# Patient Record
Sex: MALE | Race: OTHER
[De-identification: names, ages, dates, MRNs, and addresses within clinical notes are randomized per-mention and may not be internally consistent; named-entity substitution may affect disease eponyms.]

---

## 2023-03-22 ENCOUNTER — FORM ENCOUNTER (OUTPATIENT)
Age: 38
End: 2023-03-22

## 2023-03-23 ENCOUNTER — INPATIENT (INPATIENT)
Facility: HOSPITAL | Age: 38
LOS: 4 days | Discharge: HOME | DRG: 917 | End: 2023-03-28
Attending: STUDENT IN AN ORGANIZED HEALTH CARE EDUCATION/TRAINING PROGRAM | Admitting: INTERNAL MEDICINE
Payer: COMMERCIAL

## 2023-03-23 VITALS
SYSTOLIC BLOOD PRESSURE: 97 MMHG | RESPIRATION RATE: 26 BRPM | DIASTOLIC BLOOD PRESSURE: 53 MMHG | OXYGEN SATURATION: 84 % | HEART RATE: 109 BPM

## 2023-03-23 LAB
ALBUMIN SERPL ELPH-MCNC: 3.6 G/DL — SIGNIFICANT CHANGE UP (ref 3.4–5)
ALBUMIN SERPL ELPH-MCNC: 3.9 G/DL — SIGNIFICANT CHANGE UP (ref 3.3–5)
ALP SERPL-CCNC: 100 U/L — SIGNIFICANT CHANGE UP (ref 40–120)
ALP SERPL-CCNC: 87 U/L — SIGNIFICANT CHANGE UP (ref 40–120)
ALT FLD-CCNC: 105 U/L — HIGH (ref 10–45)
ALT FLD-CCNC: 142 U/L — HIGH (ref 12–42)
AMPHET UR-MCNC: NEGATIVE — SIGNIFICANT CHANGE UP
ANION GAP SERPL CALC-SCNC: 10 MMOL/L — SIGNIFICANT CHANGE UP (ref 9–16)
ANION GAP SERPL CALC-SCNC: 8 MMOL/L — SIGNIFICANT CHANGE UP (ref 5–17)
APAP SERPL-MCNC: <2 UG/ML — LOW (ref 10–30)
APPEARANCE UR: CLEAR — SIGNIFICANT CHANGE UP
APTT BLD: 32.8 SEC — SIGNIFICANT CHANGE UP (ref 27.5–35.5)
AST SERPL-CCNC: 137 U/L — HIGH (ref 10–40)
AST SERPL-CCNC: 205 U/L — HIGH (ref 15–37)
BACTERIA # UR AUTO: PRESENT /HPF
BARBITURATES UR SCN-MCNC: NEGATIVE — SIGNIFICANT CHANGE UP
BASE EXCESS BLDA CALC-SCNC: -1.2 MMOL/L — SIGNIFICANT CHANGE UP (ref -2–3)
BASE EXCESS BLDA CALC-SCNC: 4 MMOL/L — HIGH (ref -2–3)
BASOPHILS # BLD AUTO: 0.02 K/UL — SIGNIFICANT CHANGE UP (ref 0–0.2)
BASOPHILS NFR BLD AUTO: 0.1 % — SIGNIFICANT CHANGE UP (ref 0–2)
BENZODIAZ UR-MCNC: NEGATIVE — SIGNIFICANT CHANGE UP
BILIRUB SERPL-MCNC: 0.5 MG/DL — SIGNIFICANT CHANGE UP (ref 0.2–1.2)
BILIRUB SERPL-MCNC: 0.5 MG/DL — SIGNIFICANT CHANGE UP (ref 0.2–1.2)
BILIRUB UR-MCNC: NEGATIVE — SIGNIFICANT CHANGE UP
BLD GP AB SCN SERPL QL: NEGATIVE — SIGNIFICANT CHANGE UP
BUN SERPL-MCNC: 11 MG/DL — SIGNIFICANT CHANGE UP (ref 7–23)
BUN SERPL-MCNC: 9 MG/DL — SIGNIFICANT CHANGE UP (ref 7–23)
CALCIUM SERPL-MCNC: 8.5 MG/DL — SIGNIFICANT CHANGE UP (ref 8.5–10.5)
CALCIUM SERPL-MCNC: 8.9 MG/DL — SIGNIFICANT CHANGE UP (ref 8.4–10.5)
CHLORIDE SERPL-SCNC: 103 MMOL/L — SIGNIFICANT CHANGE UP (ref 96–108)
CHLORIDE SERPL-SCNC: 104 MMOL/L — SIGNIFICANT CHANGE UP (ref 96–108)
CK MB CFR SERPL CALC: 4.7 NG/ML — SIGNIFICANT CHANGE UP (ref 0–6.7)
CK SERPL-CCNC: 468 U/L — HIGH (ref 30–200)
CO2 BLDA-SCNC: 29 MMOL/L — HIGH (ref 19–24)
CO2 BLDA-SCNC: 30 MMOL/L — HIGH (ref 19–24)
CO2 SERPL-SCNC: 27 MMOL/L — SIGNIFICANT CHANGE UP (ref 22–31)
CO2 SERPL-SCNC: 28 MMOL/L — SIGNIFICANT CHANGE UP (ref 22–31)
COCAINE METAB.OTHER UR-MCNC: POSITIVE
COLOR SPEC: YELLOW — SIGNIFICANT CHANGE UP
COMMENT - URINE: SIGNIFICANT CHANGE UP
CREAT SERPL-MCNC: 0.85 MG/DL — SIGNIFICANT CHANGE UP (ref 0.5–1.3)
CREAT SERPL-MCNC: 1.29 MG/DL — SIGNIFICANT CHANGE UP (ref 0.5–1.3)
DIFF PNL FLD: ABNORMAL
EGFR: 114 ML/MIN/1.73M2 — SIGNIFICANT CHANGE UP
EGFR: 73 ML/MIN/1.73M2 — SIGNIFICANT CHANGE UP
EOSINOPHIL # BLD AUTO: 0 K/UL — SIGNIFICANT CHANGE UP (ref 0–0.5)
EOSINOPHIL NFR BLD AUTO: 0 % — SIGNIFICANT CHANGE UP (ref 0–6)
EPI CELLS # UR: SIGNIFICANT CHANGE UP /HPF (ref 0–5)
ETHANOL SERPL-MCNC: <3 MG/DL — SIGNIFICANT CHANGE UP
FLUAV AG NPH QL: SIGNIFICANT CHANGE UP
FLUBV AG NPH QL: SIGNIFICANT CHANGE UP
GLUCOSE BLDC GLUCOMTR-MCNC: 81 MG/DL — SIGNIFICANT CHANGE UP (ref 70–99)
GLUCOSE SERPL-MCNC: 262 MG/DL — HIGH (ref 70–99)
GLUCOSE SERPL-MCNC: 84 MG/DL — SIGNIFICANT CHANGE UP (ref 70–99)
GLUCOSE UR QL: 250
GRAM STN FLD: SIGNIFICANT CHANGE UP
GRAN CASTS # UR COMP ASSIST: ABNORMAL /LPF
HCO3 BLDA-SCNC: 28 MMOL/L — SIGNIFICANT CHANGE UP (ref 21–28)
HCO3 BLDA-SCNC: 28 MMOL/L — SIGNIFICANT CHANGE UP (ref 21–28)
HCT VFR BLD CALC: 38.7 % — LOW (ref 39–50)
HCT VFR BLD CALC: 42 % — SIGNIFICANT CHANGE UP (ref 39–50)
HGB BLD-MCNC: 12.1 G/DL — LOW (ref 13–17)
HGB BLD-MCNC: 13.3 G/DL — SIGNIFICANT CHANGE UP (ref 13–17)
HOROWITZ INDEX BLDA+IHG-RTO: 40 — SIGNIFICANT CHANGE UP
HYALINE CASTS # UR AUTO: ABNORMAL /LPF (ref 0–2)
IMM GRANULOCYTES NFR BLD AUTO: 0.3 % — SIGNIFICANT CHANGE UP (ref 0–0.9)
INR BLD: 1.05 — SIGNIFICANT CHANGE UP (ref 0.88–1.16)
KETONES UR-MCNC: NEGATIVE — SIGNIFICANT CHANGE UP
LACTATE SERPL-SCNC: 0.6 MMOL/L — SIGNIFICANT CHANGE UP (ref 0.5–2)
LACTATE SERPL-SCNC: 2.7 MMOL/L — HIGH (ref 0.4–2)
LEUKOCYTE ESTERASE UR-ACNC: NEGATIVE — SIGNIFICANT CHANGE UP
LIDOCAIN IGE QN: 207 U/L — SIGNIFICANT CHANGE UP (ref 73–393)
LYMPHOCYTES # BLD AUTO: 1.29 K/UL — SIGNIFICANT CHANGE UP (ref 1–3.3)
LYMPHOCYTES # BLD AUTO: 9.3 % — LOW (ref 13–44)
MAGNESIUM SERPL-MCNC: 1.8 MG/DL — SIGNIFICANT CHANGE UP (ref 1.6–2.6)
MCHC RBC-ENTMCNC: 31.3 GM/DL — LOW (ref 32–36)
MCHC RBC-ENTMCNC: 31.4 PG — SIGNIFICANT CHANGE UP (ref 27–34)
MCHC RBC-ENTMCNC: 31.7 GM/DL — LOW (ref 32–36)
MCHC RBC-ENTMCNC: 31.8 PG — SIGNIFICANT CHANGE UP (ref 27–34)
MCV RBC AUTO: 100.5 FL — HIGH (ref 80–100)
MCV RBC AUTO: 100.5 FL — HIGH (ref 80–100)
METHADONE UR-MCNC: NEGATIVE — SIGNIFICANT CHANGE UP
MONOCYTES # BLD AUTO: 0.27 K/UL — SIGNIFICANT CHANGE UP (ref 0–0.9)
MONOCYTES NFR BLD AUTO: 1.9 % — LOW (ref 2–14)
NEUTROPHILS # BLD AUTO: 12.28 K/UL — HIGH (ref 1.8–7.4)
NEUTROPHILS NFR BLD AUTO: 88.4 % — HIGH (ref 43–77)
NITRITE UR-MCNC: NEGATIVE — SIGNIFICANT CHANGE UP
NRBC # BLD: 0 /100 WBCS — SIGNIFICANT CHANGE UP (ref 0–0)
NRBC # BLD: 0 /100 WBCS — SIGNIFICANT CHANGE UP (ref 0–0)
OPIATES UR-MCNC: NEGATIVE — SIGNIFICANT CHANGE UP
PCO2 BLDA: 37 MMHG — SIGNIFICANT CHANGE UP (ref 35–48)
PCO2 BLDA: 69 MMHG — HIGH (ref 35–48)
PCO2 BLDV: 74 MMHG — HIGH (ref 42–55)
PCO2 BLDV: 74 MMHG — HIGH (ref 42–55)
PCP SPEC-MCNC: SIGNIFICANT CHANGE UP
PCP UR-MCNC: NEGATIVE — SIGNIFICANT CHANGE UP
PH BLDA: 7.22 — LOW (ref 7.35–7.45)
PH BLDA: 7.48 — HIGH (ref 7.35–7.45)
PH BLDV: 7.18 — LOW (ref 7.32–7.43)
PH BLDV: 7.24 — LOW (ref 7.32–7.43)
PH UR: 6 — SIGNIFICANT CHANGE UP (ref 5–8)
PHOSPHATE SERPL-MCNC: 4.6 MG/DL — HIGH (ref 2.5–4.5)
PLATELET # BLD AUTO: 304 K/UL — SIGNIFICANT CHANGE UP (ref 150–400)
PLATELET # BLD AUTO: 350 K/UL — SIGNIFICANT CHANGE UP (ref 150–400)
PO2 BLDA: 110 MMHG — HIGH (ref 83–108)
PO2 BLDA: 98 MMHG — SIGNIFICANT CHANGE UP (ref 83–108)
PO2 BLDV: 212 MMHG — HIGH (ref 25–45)
PO2 BLDV: 75 MMHG — HIGH (ref 25–45)
POTASSIUM SERPL-MCNC: 3.4 MMOL/L — LOW (ref 3.5–5.3)
POTASSIUM SERPL-MCNC: 4.4 MMOL/L — SIGNIFICANT CHANGE UP (ref 3.5–5.3)
POTASSIUM SERPL-SCNC: 3.4 MMOL/L — LOW (ref 3.5–5.3)
POTASSIUM SERPL-SCNC: 4.4 MMOL/L — SIGNIFICANT CHANGE UP (ref 3.5–5.3)
PROT SERPL-MCNC: 6.7 G/DL — SIGNIFICANT CHANGE UP (ref 6–8.3)
PROT SERPL-MCNC: 7.4 G/DL — SIGNIFICANT CHANGE UP (ref 6.4–8.2)
PROT UR-MCNC: 100 MG/DL
PROTHROM AB SERPL-ACNC: 12.5 SEC — SIGNIFICANT CHANGE UP (ref 10.5–13.4)
RBC # BLD: 3.85 M/UL — LOW (ref 4.2–5.8)
RBC # BLD: 4.18 M/UL — LOW (ref 4.2–5.8)
RBC # FLD: 13.6 % — SIGNIFICANT CHANGE UP (ref 10.3–14.5)
RBC # FLD: 14 % — SIGNIFICANT CHANGE UP (ref 10.3–14.5)
RBC CASTS # UR COMP ASSIST: > 10 /HPF
RH IG SCN BLD-IMP: POSITIVE — SIGNIFICANT CHANGE UP
RSV RNA NPH QL NAA+NON-PROBE: SIGNIFICANT CHANGE UP
SALICYLATES SERPL-MCNC: 1.7 MG/DL — LOW (ref 2.8–20)
SAO2 % BLDA: 98.5 % — HIGH (ref 94–98)
SAO2 % BLDA: 98.8 % — HIGH (ref 94–98)
SAO2 % BLDV: 100 % — HIGH (ref 67–88)
SAO2 % BLDV: 94.9 % — HIGH (ref 67–88)
SARS-COV-2 RNA SPEC QL NAA+PROBE: SIGNIFICANT CHANGE UP
SODIUM SERPL-SCNC: 139 MMOL/L — SIGNIFICANT CHANGE UP (ref 135–145)
SODIUM SERPL-SCNC: 141 MMOL/L — SIGNIFICANT CHANGE UP (ref 132–145)
SP GR SPEC: >=1.03 — SIGNIFICANT CHANGE UP (ref 1–1.03)
SPECIMEN SOURCE: SIGNIFICANT CHANGE UP
THC UR QL: POSITIVE
TROPONIN T SERPL-MCNC: 0.02 NG/ML — HIGH (ref 0–0.01)
UROBILINOGEN FLD QL: 1 E.U./DL — SIGNIFICANT CHANGE UP
WBC # BLD: 13.9 K/UL — HIGH (ref 3.8–10.5)
WBC # BLD: 5.49 K/UL — SIGNIFICANT CHANGE UP (ref 3.8–10.5)
WBC # FLD AUTO: 13.9 K/UL — HIGH (ref 3.8–10.5)
WBC # FLD AUTO: 5.49 K/UL — SIGNIFICANT CHANGE UP (ref 3.8–10.5)
WBC UR QL: < 5 /HPF — SIGNIFICANT CHANGE UP

## 2023-03-23 PROCEDURE — 71045 X-RAY EXAM CHEST 1 VIEW: CPT | Mod: 26

## 2023-03-23 PROCEDURE — 70450 CT HEAD/BRAIN W/O DYE: CPT | Mod: 26

## 2023-03-23 PROCEDURE — 43753 TX GASTRO INTUB W/ASP: CPT

## 2023-03-23 PROCEDURE — 76604 US EXAM CHEST: CPT | Mod: 26,GC

## 2023-03-23 PROCEDURE — 99291 CRITICAL CARE FIRST HOUR: CPT | Mod: 25

## 2023-03-23 PROCEDURE — 93971 EXTREMITY STUDY: CPT | Mod: 26,GC

## 2023-03-23 PROCEDURE — 31500 INSERT EMERGENCY AIRWAY: CPT

## 2023-03-23 PROCEDURE — 93308 TTE F-UP OR LMTD: CPT | Mod: 26,GC

## 2023-03-23 PROCEDURE — 99291 CRITICAL CARE FIRST HOUR: CPT | Mod: GC,25

## 2023-03-23 PROCEDURE — 95718 EEG PHYS/QHP 2-12 HR W/VEEG: CPT

## 2023-03-23 RX ORDER — NOREPINEPHRINE BITARTRATE/D5W 8 MG/250ML
0.05 PLASTIC BAG, INJECTION (ML) INTRAVENOUS
Qty: 8 | Refills: 0 | Status: DISCONTINUED | OUTPATIENT
Start: 2023-03-23 | End: 2023-03-24

## 2023-03-23 RX ORDER — PROPOFOL 10 MG/ML
15 INJECTION, EMULSION INTRAVENOUS
Qty: 1000 | Refills: 0 | Status: DISCONTINUED | OUTPATIENT
Start: 2023-03-23 | End: 2023-03-24

## 2023-03-23 RX ORDER — CHLORHEXIDINE GLUCONATE 213 G/1000ML
15 SOLUTION TOPICAL EVERY 12 HOURS
Refills: 0 | Status: DISCONTINUED | OUTPATIENT
Start: 2023-03-23 | End: 2023-03-24

## 2023-03-23 RX ORDER — CEFTRIAXONE 500 MG/1
2000 INJECTION, POWDER, FOR SOLUTION INTRAMUSCULAR; INTRAVENOUS ONCE
Refills: 0 | Status: DISCONTINUED | OUTPATIENT
Start: 2023-03-23 | End: 2023-03-23

## 2023-03-23 RX ORDER — ENOXAPARIN SODIUM 100 MG/ML
40 INJECTION SUBCUTANEOUS ONCE
Refills: 0 | Status: DISCONTINUED | OUTPATIENT
Start: 2023-03-23 | End: 2023-03-23

## 2023-03-23 RX ORDER — CEFTRIAXONE 500 MG/1
2000 INJECTION, POWDER, FOR SOLUTION INTRAMUSCULAR; INTRAVENOUS EVERY 24 HOURS
Refills: 0 | Status: COMPLETED | OUTPATIENT
Start: 2023-03-24 | End: 2023-03-28

## 2023-03-23 RX ORDER — CHLORHEXIDINE GLUCONATE 213 G/1000ML
1 SOLUTION TOPICAL
Refills: 0 | Status: DISCONTINUED | OUTPATIENT
Start: 2023-03-23 | End: 2023-03-28

## 2023-03-23 RX ORDER — CEFTRIAXONE 500 MG/1
1000 INJECTION, POWDER, FOR SOLUTION INTRAMUSCULAR; INTRAVENOUS ONCE
Refills: 0 | Status: COMPLETED | OUTPATIENT
Start: 2023-03-23 | End: 2023-03-23

## 2023-03-23 RX ORDER — SODIUM CHLORIDE 9 MG/ML
1000 INJECTION INTRAMUSCULAR; INTRAVENOUS; SUBCUTANEOUS ONCE
Refills: 0 | Status: COMPLETED | OUTPATIENT
Start: 2023-03-23 | End: 2023-03-23

## 2023-03-23 RX ORDER — CEFTRIAXONE 500 MG/1
INJECTION, POWDER, FOR SOLUTION INTRAMUSCULAR; INTRAVENOUS
Refills: 0 | Status: DISCONTINUED | OUTPATIENT
Start: 2023-03-23 | End: 2023-03-23

## 2023-03-23 RX ORDER — SODIUM CHLORIDE 9 MG/ML
1000 INJECTION, SOLUTION INTRAVENOUS ONCE
Refills: 0 | Status: COMPLETED | OUTPATIENT
Start: 2023-03-23 | End: 2023-03-23

## 2023-03-23 RX ORDER — CHLORHEXIDINE GLUCONATE 213 G/1000ML
15 SOLUTION TOPICAL EVERY 12 HOURS
Refills: 0 | Status: DISCONTINUED | OUTPATIENT
Start: 2023-03-23 | End: 2023-03-23

## 2023-03-23 RX ORDER — ETOMIDATE 2 MG/ML
10 INJECTION INTRAVENOUS ONCE
Refills: 0 | Status: COMPLETED | OUTPATIENT
Start: 2023-03-23 | End: 2023-03-23

## 2023-03-23 RX ORDER — ROCURONIUM BROMIDE 10 MG/ML
100 VIAL (ML) INTRAVENOUS ONCE
Refills: 0 | Status: COMPLETED | OUTPATIENT
Start: 2023-03-23 | End: 2023-03-23

## 2023-03-23 RX ORDER — ACETAMINOPHEN 500 MG
1000 TABLET ORAL ONCE
Refills: 0 | Status: COMPLETED | OUTPATIENT
Start: 2023-03-23 | End: 2023-03-23

## 2023-03-23 RX ORDER — PROPOFOL 10 MG/ML
47.62 INJECTION, EMULSION INTRAVENOUS
Qty: 1000 | Refills: 0 | Status: DISCONTINUED | OUTPATIENT
Start: 2023-03-23 | End: 2023-03-23

## 2023-03-23 RX ORDER — NALOXONE HYDROCHLORIDE 4 MG/.1ML
0.25 SPRAY NASAL
Qty: 2 | Refills: 0 | Status: DISCONTINUED | OUTPATIENT
Start: 2023-03-23 | End: 2023-03-23

## 2023-03-23 RX ORDER — NALOXONE HYDROCHLORIDE 4 MG/.1ML
0.05 SPRAY NASAL
Qty: 2 | Refills: 0 | Status: DISCONTINUED | OUTPATIENT
Start: 2023-03-23 | End: 2023-03-23

## 2023-03-23 RX ORDER — ENOXAPARIN SODIUM 100 MG/ML
40 INJECTION SUBCUTANEOUS EVERY 24 HOURS
Refills: 0 | Status: DISCONTINUED | OUTPATIENT
Start: 2023-03-23 | End: 2023-03-28

## 2023-03-23 RX ORDER — PROPOFOL 10 MG/ML
10 INJECTION, EMULSION INTRAVENOUS
Qty: 1000 | Refills: 0 | Status: DISCONTINUED | OUTPATIENT
Start: 2023-03-23 | End: 2023-03-23

## 2023-03-23 RX ORDER — DEXMEDETOMIDINE HYDROCHLORIDE IN 0.9% SODIUM CHLORIDE 4 UG/ML
0.05 INJECTION INTRAVENOUS
Qty: 400 | Refills: 0 | Status: DISCONTINUED | OUTPATIENT
Start: 2023-03-23 | End: 2023-03-27

## 2023-03-23 RX ORDER — NALOXONE HYDROCHLORIDE 4 MG/.1ML
2 SPRAY NASAL ONCE
Refills: 0 | Status: COMPLETED | OUTPATIENT
Start: 2023-03-23 | End: 2023-03-23

## 2023-03-23 RX ADMIN — Medication 100 MILLIGRAM(S): at 04:01

## 2023-03-23 RX ADMIN — SODIUM CHLORIDE 1000 MILLILITER(S): 9 INJECTION, SOLUTION INTRAVENOUS at 07:46

## 2023-03-23 RX ADMIN — PROPOFOL 4.2 MICROGRAM(S)/KG/MIN: 10 INJECTION, EMULSION INTRAVENOUS at 04:01

## 2023-03-23 RX ADMIN — CEFTRIAXONE 100 MILLIGRAM(S): 500 INJECTION, POWDER, FOR SOLUTION INTRAMUSCULAR; INTRAVENOUS at 04:59

## 2023-03-23 RX ADMIN — Medication 1000 MILLIGRAM(S): at 08:09

## 2023-03-23 RX ADMIN — Medication 6.56 MICROGRAM(S)/KG/MIN: at 10:11

## 2023-03-23 RX ADMIN — CHLORHEXIDINE GLUCONATE 15 MILLILITER(S): 213 SOLUTION TOPICAL at 18:04

## 2023-03-23 RX ADMIN — ENOXAPARIN SODIUM 40 MILLIGRAM(S): 100 INJECTION SUBCUTANEOUS at 09:08

## 2023-03-23 RX ADMIN — CEFTRIAXONE 100 MILLIGRAM(S): 500 INJECTION, POWDER, FOR SOLUTION INTRAMUSCULAR; INTRAVENOUS at 09:08

## 2023-03-23 RX ADMIN — PROPOFOL 6.3 MICROGRAM(S)/KG/MIN: 10 INJECTION, EMULSION INTRAVENOUS at 12:13

## 2023-03-23 RX ADMIN — NALOXONE HYDROCHLORIDE 62.5 MG/HR: 4 SPRAY NASAL at 19:15

## 2023-03-23 RX ADMIN — DEXMEDETOMIDINE HYDROCHLORIDE IN 0.9% SODIUM CHLORIDE 0.88 MICROGRAM(S)/KG/HR: 4 INJECTION INTRAVENOUS at 12:12

## 2023-03-23 RX ADMIN — SODIUM CHLORIDE 1000 MILLILITER(S): 9 INJECTION INTRAMUSCULAR; INTRAVENOUS; SUBCUTANEOUS at 04:00

## 2023-03-23 RX ADMIN — NALOXONE HYDROCHLORIDE 62.5 MG/HR: 4 SPRAY NASAL at 10:07

## 2023-03-23 RX ADMIN — Medication 400 MILLIGRAM(S): at 07:18

## 2023-03-23 RX ADMIN — ETOMIDATE 10 MILLIGRAM(S): 2 INJECTION INTRAVENOUS at 04:00

## 2023-03-23 RX ADMIN — PROPOFOL 6.3 MICROGRAM(S)/KG/MIN: 10 INJECTION, EMULSION INTRAVENOUS at 16:23

## 2023-03-23 RX ADMIN — NALOXONE HYDROCHLORIDE 2 MILLIGRAM(S): 4 SPRAY NASAL at 00:30

## 2023-03-23 NOTE — H&P ADULT - HISTORY OF PRESENT ILLNESS
Patient is Male w/ no PMHx, unknown age, approximately 40 years old per [facility] ED, who presents by EMS for altered mental status. He was found on a bus with altered mental status, no bystanders on bus to provide history. He received Narcan x1 by JULI with no clinical change. EMS gave a second Narcan. Patient arrived to [facility] with white powder on his clothes and belongings.    At [facility] he was given Narcan x1 (total of 3x Narcan including ems), temp low at 94.6 rectally, ED started rewarming him. Sp02 84% on room air, continued altered mental status. He was started on non-rebreather w/ altered mental status. VBG w/ pH 7.18, pCO2 74, p02 75, 02 94.9, following VBG after non-rebreather w/ pH 7.24, pCO2 74, p02 212. Due to no improvement and continued altered mental status, patient was intubated at [facility].      In the ED  Vital signs: T 94.6 (rectally) , RR 26, Sp02 84%, BP 97/53  Labs notable for: Hemoglobin 12.1, .5, Potassium 3.4, Glucose 262, , , Lactate 2.7, UA+ blood, RBC, bacteria, granular casts, UTox + THC and Cocaine  Interventions: CTX 1g, Etomidate 10mg IV Push, Naloxone 2mg IV Push, Rocuronium 100mg IV Push, NS 1L. Propofol started for sedation after intubation.  Consults: ICU

## 2023-03-23 NOTE — ED ADULT NURSE REASSESSMENT NOTE - NS ED NURSE REASSESS COMMENT FT1
While in CT BP decreased. Propofol decreased to 5mcg/ 2.1mL/hr by RN Hyde. Report given and waiting of transportation for transfer to [facility].

## 2023-03-23 NOTE — ED ADULT NURSE REASSESSMENT NOTE - NS ED NURSE REASSESS COMMENT FT1
Pt. remains at baseline from when he arrived - unresponsive. As per Radha, pt. will have to be intubated. Pt. remains at baseline from when he arrived - unresponsive. Demetra Randi remains in place with improvement in temp noted.  As per Radha, pt. will have to be intubated.

## 2023-03-23 NOTE — H&P ADULT - ASSESSMENT
Neuro    #Altered mental status  Differential includes seizures vs trauma vs drug-induced encephalopathy. Patient found on bus with altered mental status. UTox + for THC and cocaine. Also, found w/ white powder on him, per ED signout. Lactate elevated to 2.7.  - get CPK for seizure workup  - f/u CT head  - vEEG  - f/u ammonia level  - f/u thyroid level  - consider LP  - trend lactate    #Hypothermia  Patient w/ temp of 94.6 degrees F on admisison to [facility]. After warming, repeat temp at 97.9 degrees F. Differential includes hypothyroidism.   - f/u tsh    #Sedation  - c/w Propofol w/ RAAS -3 to -4  - no fentanyl, avoid opiaties iso concern for drug overdose (s/p 3x narcan)    Pulmonary    #Intubated  Intubated for airway protection and respiratory failure at [facility].  - SBT in am    #Acute hypercarbic respiratory failure  ABG w/ pH 7.18, pCO2 74 on admission. Requiring intubation for altered mental status and respiratory failure at [facility]. Differential includes drug intoxication causing respiratory depression causing respiratory acidosis.   - increase minute ventilation on vent to blow off co2- , RR 16, PEEP 5, Fi02 40%  - confirm ET tube w/ chest xray  - f/u abg on arrival    Cardiovascular    #Hypotension  Patient w/ soft BPs at [facility]- 92/53 on admission. Not currently requiring pressors.  - keep BP normotensive  - can give IV fluids- might have cold diuresis  - f/u cardiac enzymes  - avoid beta blockers  - if have to give pressor- would consider neosynephrine    Renal    #Elevated creatinine, unknown baseline  - IV fluid resuscitation as needed  - f/u creatine kinase   - f/u mag and phos  - trend creatinine  - avoid nephrotoxic meds      Heme    #Anemia  Hemoglobin 12.1. No signs of bleeding on exam.  - continue to trend hgb  - active type and screen  - transfuse for hgb <7    ID  No obvious source of infection. No leukocytosis, afebrile.  - trend wbc  - f/u blood cultures    Preventive measures  F: None  E: replete as necessary  D: NPO  DVT: SCDs   Neuro    #Altered mental status  Differential includes seizures vs trauma vs drug-induced encephalopathy. Patient found on bus with altered mental status. Received Narcan x3. UTox + for THC and cocaine. Per ED signout- also, found w/ white powder on him. Lactate elevated to 2.7.   - On physical exam- goosebumps, stool on legs, miosis, consider opioid withdrawal.  - get CPK for seizure workup  - f/u CT head  - vEEG  - f/u ammonia level  - f/u thyroid level  - consider LP  - trend lactate    #Hypothermia  Patient w/ temp of 94.6 degrees F on admisison to [facility]. After warming, repeat temp at 97.9 degrees F. Differential includes hypothyroidism.   - f/u tsh    #Sedation  - c/w Propofol w/ RAAS -3 to -4  - no fentanyl, avoid opiaties iso concern for drug overdose (s/p 3x narcan)    Pulmonary    #Intubated  Intubated for airway protection and respiratory failure at [facility].  - SBT in am    #Acute hypercarbic respiratory failure  ABG w/ pH 7.18, pCO2 74 on admission. Requiring intubation for altered mental status and respiratory failure at [facility]. Differential includes drug intoxication causing respiratory depression causing respiratory acidosis.   - increase minute ventilation on vent to blow off co2- , RR 16, PEEP 5, Fi02 40%  - confirm ET tube w/ chest xray  - f/u abg on arrival    Cardiovascular    #Hypotension  Patient w/ soft BPs at [facility]- 92/53 on admission. Not currently requiring pressors.  - keep BP normotensive  - can give IV fluids- might have cold diuresis  - f/u cardiac enzymes  - avoid beta blockers  - if have to give pressor- would consider neosynephrine    Renal    #Elevated creatinine, unknown baseline  - IV fluid resuscitation as needed  - f/u creatine kinase   - f/u mag and phos  - trend creatinine  - avoid nephrotoxic meds      Heme    #Anemia  Hemoglobin 12.1. No signs of bleeding on exam.  - continue to trend hgb  - active type and screen  - transfuse for hgb <7    ID  No obvious source of infection. No leukocytosis, afebrile.  - trend wbc  - f/u blood cultures    Preventive measures  F: None  E: replete as necessary  D: NPO  DVT: SCDs   Neuro    #Altered mental status  Differential includes seizures vs trauma vs drug-induced encephalopathy. Patient found on bus with altered mental status. Received Narcan x3. UTox + for THC and cocaine. Per ED signout- also, found w/ white powder on him. Lactate elevated to 2.7.   - get CPK for seizure workup  - f/u CT head  - vEEG  - f/u ammonia level  - f/u thyroid level  - consider LP  - trend lactate    #Hypothermia  Patient w/ temp of 94.6 degrees F on admisison to [facility]. After warming, repeat temp at 97.9 degrees F. Differential includes hypothyroidism.   - f/u tsh    #Sedation  - c/w Propofol w/ RAAS -3 to -4  - no fentanyl, avoid opiaties iso concern for drug overdose (s/p 3x narcan)    Pulmonary    #Intubated  Intubated for airway protection and respiratory failure at [facility].  - SBT in am    #Acute hypercarbic respiratory failure  ABG w/ pH 7.18, pCO2 74 on admission. Requiring intubation for altered mental status and respiratory failure at [facility]. Differential includes drug intoxication causing respiratory depression causing respiratory acidosis.   - increase minute ventilation on vent to blow off co2- , RR 16, PEEP 5, Fi02 40%  - confirm ET tube w/ chest xray  - f/u abg on arrival    Cardiovascular    #Hypotension  Patient w/ soft BPs at [facility]- 92/53 on admission. Not currently requiring pressors.  - keep BP normotensive  - can give IV fluids- might have cold diuresis  - f/u cardiac enzymes  - avoid beta blockers  - if have to give pressor- would consider neosynephrine    Renal    #Elevated creatinine, unknown baseline  - IV fluid resuscitation as needed  - f/u creatine kinase   - f/u mag and phos  - trend creatinine  - avoid nephrotoxic meds      Heme    #Anemia  Hemoglobin 12.1. No signs of bleeding on exam.  - continue to trend hgb  - active type and screen  - transfuse for hgb <7    ID    #R/o aspiration pneumonia  Patient w/ altered mental status, intubated for airway protection. New leukocytosis w/ WBC elevated to 13.9, febrile to 101 degrees F.   - trend wbc  - start CTX 2g qd  - f/u blood cultures  - f/u urine culture    Preventive measures  F: None  E: replete as necessary  D: NPO  DVT: SCDs   Patient is Male w/ no PMHx, unknown age, approximately 40 years old per [facility] ED, who presents for altered mental status, intubated at [facility] for airway protection, admitted for altered mental status due to drugs (?fentanyl) vs seizure vs trauma vs infectious etiology.    Neuro    #Altered mental status  Differential includes seizures vs trauma vs drug-induced encephalopathy. Patient found on bus with altered mental status. Received Narcan x3. UTox + for THC and cocaine. Per ED signout- also, found w/ white powder on him. Lactate elevated to 2.7.   - get CPK for seizure workup  - f/u CT head  - vEEG  - f/u ammonia level  - f/u thyroid level  - consider LP  - trend lactate    #Hypothermia  Patient w/ temp of 94.6 degrees F on admisison to [facility]. After warming, repeat temp at 97.9 degrees F. Differential includes hypothyroidism.   - f/u tsh    #Sedation  - c/w Propofol w/ RAAS -3 to -4  - no fentanyl, avoid opiaties iso concern for drug overdose (s/p 3x narcan)    Pulmonary    #Intubated  Intubated for airway protection and respiratory failure at [facility].  - SBT in am    #Acute hypercarbic respiratory failure  ABG w/ pH 7.18, pCO2 74 on admission. Requiring intubation for altered mental status and respiratory failure at [facility]. Differential includes drug intoxication causing respiratory depression causing respiratory acidosis.   - increase minute ventilation on vent to blow off co2- , RR 16, PEEP 5, Fi02 40%  - confirm ET tube w/ chest xray  - f/u abg on arrival    Cardiovascular    #Hypotension  Patient w/ soft BPs at [facility]- 92/53 on admission. Not currently requiring pressors.  - keep BP normotensive  - can give IV fluids- might have cold diuresis  - f/u cardiac enzymes  - avoid beta blockers  - if have to give pressor- would consider neosynephrine    Renal    #Elevated creatinine, unknown baseline  - IV fluid resuscitation as needed  - f/u creatine kinase   - f/u mag and phos  - trend creatinine  - avoid nephrotoxic meds      Heme    #Anemia  Hemoglobin 12.1. No signs of bleeding on exam.  - continue to trend hgb  - active type and screen  - transfuse for hgb <7    ID    #R/o aspiration pneumonia  Patient w/ altered mental status, intubated for airway protection. New leukocytosis w/ WBC elevated to 13.9, febrile to 101 degrees F.   - trend wbc  - start CTX 2g qd  - f/u blood cultures  - f/u urine culture    Preventive measures  F: None  E: replete as necessary  D: NPO  DVT: SCDs

## 2023-03-23 NOTE — ED PROVIDER NOTE - OBJECTIVE STATEMENT
Approximately 40-year-old male unknown past medical history presents by EMS for evaluation of altered mental status.  Per EMS they were called for AMS for a person on the bus.  On their arrival JULI had already given Narcan, but unclear if any clinical change.  EMS gave additional Narcan.  Patient unable to provide history.  No bystanders on the bus new the patient to provide any further history.  Patient arrives with a bag of white powder on his belongings.

## 2023-03-23 NOTE — ED ADULT TRIAGE NOTE - CHIEF COMPLAINT QUOTE
Pt BIBA from bus, EMS states pt was found unresponsive by , given 4mg of internasal Narcan and 2mg of IM Narcan. Pt presents unresponsive w/ labored breathing. Pt clinically upgraded to Dr Garcia, direct to room.

## 2023-03-23 NOTE — ED PROVIDER NOTE - CLINICAL SUMMARY MEDICAL DECISION MAKING FREE TEXT BOX
Approximately 40-year-old male with altered mental status.  Differential is broad including in tox as patient was noted to have drugs on him versus seizure with postictal period versus environmental exposure as patient is hypothermic versus other.  Plan for cardiac monitor, labs, additional Narcan, CT head, chest x-ray, EKG, observe and reassess

## 2023-03-23 NOTE — ED PROCEDURE NOTE - CPROC ED INFORMED CONSENT1
Benefits, risks, and possible complications of procedure explained to patient/caregiver who verbalized understanding and gave verbal consent.
6
Benefits, risks, and possible complications of procedure explained to patient/caregiver who verbalized understanding and gave verbal consent.
63yo male with urinary retention, carr catheter and urology follow up

## 2023-03-23 NOTE — PROCEDURE NOTE - NSUS ED ADDITIONAL DETAIL1 FT
DVT study performed bilaterally with no clot DVT study of Lower Extremities performed bilaterally with no clot

## 2023-03-23 NOTE — ED PROVIDER NOTE - PHYSICAL EXAMINATION
GENERAL: well appearing, Sonorous breathing  HEAD: atraumatic   EYES: Pupils 2 mm bilaterally but reactive  ENT: moist oral mucosa   CARDIAC: Tachycardic in the low 100s, regular rhythm  RESPIRATORY: Mild increased work of breathing with snoring, symmetric chest rise, bilateral breath sounds present, lungs clear  ABDO: Soft nontender nondistended  MUSCULOSKELETAL: no deformity   NEUROLOGICAL: Somnolent, protecting airway with gag reflex present, withdraws to pain  SKIN: no visible rash, Dried feces noted to bilateral lower extremities

## 2023-03-23 NOTE — PROCEDURE NOTE - NSUS ED INFORMED CONSENT1
Benefits, risks, and possible complications of procedure explained to patient/caregiver who verbalized understanding and gave verbal consent. This was an emergent procedure and consent was implied.

## 2023-03-23 NOTE — ED ADULT NURSE NOTE - OBJECTIVE STATEMENT
Pt is a 41 y/o M c/o altered mental status. patient found unresponsive by . Pt BIBEMS for altered mental status. Patient given 4 mg Narcan and 2mg Narcan upon arrival. Pt placed on continuous cardiac monitor.

## 2023-03-23 NOTE — H&P ADULT - NSHPLABSRESULTS_GEN_ALL_CORE
LABS:                         12.1   5.49  )-----------( 304      ( 23 Mar 2023 00:43 )             38.7     03-23    141  |  103  |  11  ----------------------------<  262<H>  3.4<L>   |  28  |  1.29    Ca    8.5      23 Mar 2023 00:43    TPro  7.4  /  Alb  3.6  /  TBili  0.5  /  DBili  x   /  AST  205<H>  /  ALT  142<H>  /  AlkPhos  100  03-23      Urinalysis Basic - ( 23 Mar 2023 00:44 )    Color: Yellow / Appearance: Clear / SG: >=1.030 / pH: x  Gluc: x / Ketone: NEGATIVE  / Bili: NEGATIVE / Urobili: 1.0 E.U./dL   Blood: x / Protein: 100 mg/dL / Nitrite: NEGATIVE   Leuk Esterase: NEGATIVE / RBC: > 10 /HPF / WBC < 5 /HPF   Sq Epi: x / Non Sq Epi: 0-5 /HPF / Bacteria: Present /HPF        Lactate, Blood: 2.7 mmoL/L (03-23 @ 00:43)      RADIOLOGY, EKG & ADDITIONAL TESTS: Reviewed.

## 2023-03-23 NOTE — PROCEDURE NOTE - NSUSCPTCODES_ED_ALL
72716 Duplex Scan of Extremity Veins (Unilateral)
45459 US Chest (PTX, Pleural Effussion/CHF vs COPD)
98527 Echocardiography Transthoracic with Image 2D (Echo/FAST)

## 2023-03-23 NOTE — ED PROCEDURE NOTE - CPROC ED TIME OUT STATEMENT1
“Patient's name, , procedure and correct site were confirmed during the [place] Timeout.”
“Patient's name, , procedure and correct site were confirmed during the [place] Timeout.”

## 2023-03-23 NOTE — H&P ADULT - NSHPPHYSICALEXAM_GEN_ALL_CORE
VITAL SIGNS:  T(C): 35.7 (03-23-23 @ 04:34), Max: 36.6 (03-23-23 @ 03:57)  T(F): 96.3 (03-23-23 @ 04:34), Max: 97.9 (03-23-23 @ 03:57)  HR: 101 (03-23-23 @ 06:33) (78 - 118)  BP: 114/66 (03-23-23 @ 06:00) (87/45 - 116/81)  BP(mean): 83 (03-23-23 @ 06:00) (83 - 83)  RR: 28 (03-23-23 @ 06:00) (22 - 84)  SpO2: 99% (03-23-23 @ 06:33) (84% - 100%)  Wt(kg): --    PHYSICAL EXAM:  Constitutional: intubated, sedated, resting in bed;  Head: NC/AT  Eyes: bilateral pupils 2mm, reactive to light  ENT: no nasal discharge; uvula midline, no oropharyngeal erythema or exudates;   Neck: supple;   Respiratory: CTA B/L; no W/R/R;  Cardiac: +S1/S2; RRR; no M/R/G;   Gastrointestinal: abdomen soft, NT/ND; no rebound or guarding  Extremities: no clubbing or cyanosis; no peripheral edema  Vascular: 2+ radial, DP pulses B/L  Dermatologic: skin warm, dry, with goosebumps on bilateral upper extremities and chest  Neurologic: sedated, unable to assess mental status, RASS -3 to -4

## 2023-03-23 NOTE — H&P ADULT - CRITICAL CARE ATTENDING COMMENT
Found down, time unknown, suspected drug overdose but unclear if intentional. C/b respiratory failure requiring mechanical ventilation. Significant secretions concerning for aspiration pneumonia. Blood/urine/sputum cultures. Broad spectrum abx. Obtain collateral if possible. Rest of plan as per above. All bloodwork, available records, and imaging personally reviewed.

## 2023-03-23 NOTE — ED ADULT NURSE REASSESSMENT NOTE - NS ED NURSE REASSESS COMMENT FT1
Pt. intubated with 7.5mm cuffed tube. 16F nasogastric tube placed in right nare. Pt. remains at baseline, unresponsive. To be transferred to [facility] MICU.

## 2023-03-23 NOTE — ED PROVIDER NOTE - PROGRESS NOTE DETAILS
EKG on my interpretation normal sinus rhythm, rate 102, QTc 479, LVH, no ischemic changes, no ectopy Labs with respiratory acidosis with pH 7.18 and PCO2 elevated at 74  U tox positive for THC and cocaine, but negative for opiates and methadone  No improvement in patient's mental status and repeat VBG without improvement in hypercapnia so patient was intubated with acceptable postintubation chest x-ray and vital signs  We will attempt to obtain CT head prior to transfer  CTC called and discussed case with ICU Dr. Ibarra who request admission to Dr. Gaston Labs with respiratory acidosis with pH 7.18 and PCO2 elevated at 74  U tox positive for THC and cocaine, but negative for opiates and methadone  No improvement in patient's mental status and repeat VBG without improvement in hypercapnia so patient was intubated with acceptable postintubation chest x-ray and vital signs  Abx ordered as pt meets SIRS criteria, but I have low suspicion for sepsis    CT w/ [facility] (per my read)   CTC called and discussed case with ICU Dr. Ibarra who request admission to Dr. Gaston

## 2023-03-24 LAB
A1C WITH ESTIMATED AVERAGE GLUCOSE RESULT: 4.2 % — SIGNIFICANT CHANGE UP (ref 4–5.6)
ALBUMIN SERPL ELPH-MCNC: 3.2 G/DL — LOW (ref 3.3–5)
ALP SERPL-CCNC: 71 U/L — SIGNIFICANT CHANGE UP (ref 40–120)
ALT FLD-CCNC: 58 U/L — HIGH (ref 10–45)
ANION GAP SERPL CALC-SCNC: 11 MMOL/L — SIGNIFICANT CHANGE UP (ref 5–17)
AST SERPL-CCNC: 39 U/L — SIGNIFICANT CHANGE UP (ref 10–40)
BASOPHILS # BLD AUTO: 0.03 K/UL — SIGNIFICANT CHANGE UP (ref 0–0.2)
BASOPHILS NFR BLD AUTO: 0.2 % — SIGNIFICANT CHANGE UP (ref 0–2)
BILIRUB SERPL-MCNC: 0.5 MG/DL — SIGNIFICANT CHANGE UP (ref 0.2–1.2)
BUN SERPL-MCNC: 9 MG/DL — SIGNIFICANT CHANGE UP (ref 7–23)
CALCIUM SERPL-MCNC: 8.9 MG/DL — SIGNIFICANT CHANGE UP (ref 8.4–10.5)
CHLORIDE SERPL-SCNC: 104 MMOL/L — SIGNIFICANT CHANGE UP (ref 96–108)
CK SERPL-CCNC: 188 U/L — SIGNIFICANT CHANGE UP (ref 30–200)
CO2 SERPL-SCNC: 26 MMOL/L — SIGNIFICANT CHANGE UP (ref 22–31)
CREAT SERPL-MCNC: 1.03 MG/DL — SIGNIFICANT CHANGE UP (ref 0.5–1.3)
CULTURE RESULTS: NO GROWTH — SIGNIFICANT CHANGE UP
EGFR: 95 ML/MIN/1.73M2 — SIGNIFICANT CHANGE UP
EOSINOPHIL # BLD AUTO: 0.03 K/UL — SIGNIFICANT CHANGE UP (ref 0–0.5)
EOSINOPHIL NFR BLD AUTO: 0.2 % — SIGNIFICANT CHANGE UP (ref 0–6)
ESTIMATED AVERAGE GLUCOSE: 74 MG/DL — SIGNIFICANT CHANGE UP (ref 68–114)
FOLATE SERPL-MCNC: 9.3 NG/ML — SIGNIFICANT CHANGE UP
GLUCOSE SERPL-MCNC: 88 MG/DL — SIGNIFICANT CHANGE UP (ref 70–99)
HCT VFR BLD CALC: 35 % — LOW (ref 39–50)
HGB BLD-MCNC: 11.3 G/DL — LOW (ref 13–17)
IMM GRANULOCYTES NFR BLD AUTO: 0.6 % — SIGNIFICANT CHANGE UP (ref 0–0.9)
LYMPHOCYTES # BLD AUTO: 1.27 K/UL — SIGNIFICANT CHANGE UP (ref 1–3.3)
LYMPHOCYTES # BLD AUTO: 7.1 % — LOW (ref 13–44)
MAGNESIUM SERPL-MCNC: 1.6 MG/DL — SIGNIFICANT CHANGE UP (ref 1.6–2.6)
MCHC RBC-ENTMCNC: 31.7 PG — SIGNIFICANT CHANGE UP (ref 27–34)
MCHC RBC-ENTMCNC: 32.3 GM/DL — SIGNIFICANT CHANGE UP (ref 32–36)
MCV RBC AUTO: 98 FL — SIGNIFICANT CHANGE UP (ref 80–100)
MONOCYTES # BLD AUTO: 0.51 K/UL — SIGNIFICANT CHANGE UP (ref 0–0.9)
MONOCYTES NFR BLD AUTO: 2.8 % — SIGNIFICANT CHANGE UP (ref 2–14)
NEUTROPHILS # BLD AUTO: 16 K/UL — HIGH (ref 1.8–7.4)
NEUTROPHILS NFR BLD AUTO: 89.1 % — HIGH (ref 43–77)
NRBC # BLD: 0 /100 WBCS — SIGNIFICANT CHANGE UP (ref 0–0)
PHOSPHATE SERPL-MCNC: 2.4 MG/DL — LOW (ref 2.5–4.5)
PLATELET # BLD AUTO: 303 K/UL — SIGNIFICANT CHANGE UP (ref 150–400)
POTASSIUM SERPL-MCNC: 3.8 MMOL/L — SIGNIFICANT CHANGE UP (ref 3.5–5.3)
POTASSIUM SERPL-SCNC: 3.8 MMOL/L — SIGNIFICANT CHANGE UP (ref 3.5–5.3)
PROT SERPL-MCNC: 5.8 G/DL — LOW (ref 6–8.3)
RBC # BLD: 3.57 M/UL — LOW (ref 4.2–5.8)
RBC # FLD: 13.8 % — SIGNIFICANT CHANGE UP (ref 10.3–14.5)
SODIUM SERPL-SCNC: 141 MMOL/L — SIGNIFICANT CHANGE UP (ref 135–145)
SPECIMEN SOURCE: SIGNIFICANT CHANGE UP
T4 FREE SERPL-MCNC: 1.07 NG/DL — SIGNIFICANT CHANGE UP (ref 0.93–1.7)
TSH SERPL-MCNC: 5.16 UIU/ML — HIGH (ref 0.27–4.2)
VIT B12 SERPL-MCNC: 591 PG/ML — SIGNIFICANT CHANGE UP (ref 232–1245)
WBC # BLD: 17.95 K/UL — HIGH (ref 3.8–10.5)
WBC # FLD AUTO: 17.95 K/UL — HIGH (ref 3.8–10.5)

## 2023-03-24 PROCEDURE — 99233 SBSQ HOSP IP/OBS HIGH 50: CPT | Mod: GC

## 2023-03-24 RX ORDER — HALOPERIDOL DECANOATE 100 MG/ML
5 INJECTION INTRAMUSCULAR ONCE
Refills: 0 | Status: COMPLETED | OUTPATIENT
Start: 2023-03-24 | End: 2023-03-24

## 2023-03-24 RX ORDER — POTASSIUM CHLORIDE 20 MEQ
10 PACKET (EA) ORAL
Refills: 0 | Status: COMPLETED | OUTPATIENT
Start: 2023-03-24 | End: 2023-03-24

## 2023-03-24 RX ORDER — PHENOBARBITAL 60 MG
130 TABLET ORAL ONCE
Refills: 0 | Status: DISCONTINUED | OUTPATIENT
Start: 2023-03-24 | End: 2023-03-24

## 2023-03-24 RX ORDER — HALOPERIDOL DECANOATE 100 MG/ML
5 INJECTION INTRAMUSCULAR EVERY 4 HOURS
Refills: 0 | Status: DISCONTINUED | OUTPATIENT
Start: 2023-03-24 | End: 2023-03-27

## 2023-03-24 RX ORDER — HALOPERIDOL DECANOATE 100 MG/ML
5 INJECTION INTRAMUSCULAR ONCE
Refills: 0 | Status: DISCONTINUED | OUTPATIENT
Start: 2023-03-24 | End: 2023-03-24

## 2023-03-24 RX ORDER — MAGNESIUM SULFATE 500 MG/ML
2 VIAL (ML) INJECTION ONCE
Refills: 0 | Status: COMPLETED | OUTPATIENT
Start: 2023-03-24 | End: 2023-03-24

## 2023-03-24 RX ORDER — ACETAMINOPHEN 500 MG
1000 TABLET ORAL ONCE
Refills: 0 | Status: COMPLETED | OUTPATIENT
Start: 2023-03-24 | End: 2023-03-24

## 2023-03-24 RX ORDER — NOREPINEPHRINE BITARTRATE/D5W 8 MG/250ML
0.05 PLASTIC BAG, INJECTION (ML) INTRAVENOUS
Qty: 8 | Refills: 0 | Status: DISCONTINUED | OUTPATIENT
Start: 2023-03-24 | End: 2023-03-26

## 2023-03-24 RX ADMIN — ENOXAPARIN SODIUM 40 MILLIGRAM(S): 100 INJECTION SUBCUTANEOUS at 12:27

## 2023-03-24 RX ADMIN — Medication 100 MILLIEQUIVALENT(S): at 11:10

## 2023-03-24 RX ADMIN — DEXMEDETOMIDINE HYDROCHLORIDE IN 0.9% SODIUM CHLORIDE 0.88 MICROGRAM(S)/KG/HR: 4 INJECTION INTRAVENOUS at 11:10

## 2023-03-24 RX ADMIN — Medication 100 MILLIEQUIVALENT(S): at 13:32

## 2023-03-24 RX ADMIN — Medication 2 MILLIGRAM(S): at 20:25

## 2023-03-24 RX ADMIN — HALOPERIDOL DECANOATE 5 MILLIGRAM(S): 100 INJECTION INTRAMUSCULAR at 08:15

## 2023-03-24 RX ADMIN — Medication 25 GRAM(S): at 09:13

## 2023-03-24 RX ADMIN — CEFTRIAXONE 100 MILLIGRAM(S): 500 INJECTION, POWDER, FOR SOLUTION INTRAMUSCULAR; INTRAVENOUS at 04:47

## 2023-03-24 RX ADMIN — HALOPERIDOL DECANOATE 5 MILLIGRAM(S): 100 INJECTION INTRAMUSCULAR at 20:25

## 2023-03-24 RX ADMIN — Medication 6.56 MICROGRAM(S)/KG/MIN: at 09:10

## 2023-03-24 RX ADMIN — Medication 2 MILLIGRAM(S): at 17:23

## 2023-03-24 RX ADMIN — Medication 1000 MILLIGRAM(S): at 04:25

## 2023-03-24 RX ADMIN — CHLORHEXIDINE GLUCONATE 15 MILLILITER(S): 213 SOLUTION TOPICAL at 05:10

## 2023-03-24 RX ADMIN — HALOPERIDOL DECANOATE 5 MILLIGRAM(S): 100 INJECTION INTRAMUSCULAR at 17:23

## 2023-03-24 RX ADMIN — DEXMEDETOMIDINE HYDROCHLORIDE IN 0.9% SODIUM CHLORIDE 0.88 MICROGRAM(S)/KG/HR: 4 INJECTION INTRAVENOUS at 14:57

## 2023-03-24 RX ADMIN — DEXMEDETOMIDINE HYDROCHLORIDE IN 0.9% SODIUM CHLORIDE 0.88 MICROGRAM(S)/KG/HR: 4 INJECTION INTRAVENOUS at 23:06

## 2023-03-24 RX ADMIN — Medication 2 MILLIGRAM(S): at 08:15

## 2023-03-24 RX ADMIN — Medication 400 MILLIGRAM(S): at 03:52

## 2023-03-24 RX ADMIN — DEXMEDETOMIDINE HYDROCHLORIDE IN 0.9% SODIUM CHLORIDE 0.88 MICROGRAM(S)/KG/HR: 4 INJECTION INTRAVENOUS at 18:36

## 2023-03-24 RX ADMIN — Medication 6.56 MICROGRAM(S)/KG/MIN: at 04:14

## 2023-03-24 NOTE — PATIENT PROFILE ADULT - FALL HARM RISK - HARM RISK INTERVENTIONS
Assistance with ambulation/Assistance OOB with selected safe patient handling equipment/Communicate Risk of Fall with Harm to all staff/Reinforce activity limits and safety measures with patient and family/Review medications for side effects contributing to fall risk/Sit up slowly, dangle for a short time, stand at bedside before walking/Tailored Fall Risk Interventions/Toileting schedule using arm’s reach rule for commode and bathroom/Visual Cue: Yellow wristband and red socks/Bed in lowest position, wheels locked, appropriate side rails in place/Call bell, personal items and telephone in reach/Instruct patient to call for assistance before getting out of bed or chair/Non-slip footwear when patient is out of bed/[place] to call system/Physically safe environment - no spills, clutter or unnecessary equipment/Purposeful Proactive Rounding/Room/bathroom lighting operational, light cord in reach

## 2023-03-24 NOTE — DIETITIAN INITIAL EVALUATION ADULT - PERTINENT LABORATORY DATA
03-24    141  |  104  |  9   ----------------------------<  88  3.8   |  26  |  1.03    Ca    8.9      24 Mar 2023 04:11  Phos  2.4     03-24  Mg     1.6     03-24    TPro  5.8<L>  /  Alb  3.2<L>  /  TBili  0.5  /  DBili  x   /  AST  39  /  ALT  58<H>  /  AlkPhos  71  03-24  A1C with Estimated Average Glucose Result: 4.2 % (03-24-23 @ 04:11)

## 2023-03-24 NOTE — PROGRESS NOTE ADULT - ASSESSMENT
Patient is Male w/ no PMHx, unknown age, approximately 40 years old per [facility] ED, who presents for altered mental status, intubated at [facility] for airway protection, admitted for altered mental status due to drugs (?fentanyl) vs seizure vs trauma vs infectious etiology.    Neuro    #Altered mental status  Differential includes seizures vs trauma vs drug-induced encephalopathy. Patient found on bus with altered mental status. Received Narcan x3. UTox + for THC and cocaine. Per ED signout- also, found w/ white powder on him. Lactate elevated to 2.7.   - get CPK for seizure workup  - f/u CT head  - vEEG  - f/u ammonia level  - f/u thyroid level  - consider LP  - trend lactate    #Hypothermia  Patient w/ temp of 94.6 degrees F on admisison to [facility]. After warming, repeat temp at 97.9 degrees F. Differential includes hypothyroidism.   - f/u tsh    #Sedation  - c/w Propofol w/ RAAS -3 to -4  - no fentanyl, avoid opiaties iso concern for drug overdose (s/p 3x narcan)    Pulmonary    #Intubated  Intubated for airway protection and respiratory failure at [facility].  - SBT in am    #Acute hypercarbic respiratory failure  ABG w/ pH 7.18, pCO2 74 on admission. Requiring intubation for altered mental status and respiratory failure at [facility]. Differential includes drug intoxication causing respiratory depression causing respiratory acidosis.   - increase minute ventilation on vent to blow off co2- , RR 16, PEEP 5, Fi02 40%  - confirm ET tube w/ chest xray  - f/u abg on arrival    Cardiovascular    #Hypotension  Patient w/ soft BPs at [facility]- 92/53 on admission. Not currently requiring pressors.  - keep BP normotensive  - can give IV fluids- might have cold diuresis  - f/u cardiac enzymes  - avoid beta blockers  - if have to give pressor- would consider neosynephrine    Renal    #Elevated creatinine, unknown baseline  - IV fluid resuscitation as needed  - f/u creatine kinase   - f/u mag and phos  - trend creatinine  - avoid nephrotoxic meds      Heme    #Anemia  Hemoglobin 12.1. No signs of bleeding on exam.  - continue to trend hgb  - active type and screen  - transfuse for hgb <7    ID    #R/o aspiration pneumonia  Patient w/ altered mental status, intubated for airway protection. New leukocytosis w/ WBC elevated to 13.9, febrile to 101 degrees F.   - trend wbc  - start CTX 2g qd  - f/u blood cultures  - f/u urine culture    Preventive measures  F: None  E: replete as necessary  D: NPO  DVT: SCDs   Patient is Male w/ no PMHx, unknown age, approximately 40 years old per [facility] ED, who presents for altered mental status, intubated at [facility] for airway protection, admitted for altered mental status due to drugs (?fentanyl) vs seizure vs trauma vs infectious etiology.    Neuro    #Altered mental status - RESOLVING  Differential includes seizures vs trauma vs drug-induced encephalopathy. Patient found on bus with altered mental status. Received Narcan x3. UTox + for THC and cocaine. Per ED signout- also, found w/ white powder on him. Lactate elevated to 2.7. vEEG w/o evidence of seizure. CT Head unrevealing. CPK elevated 468, now decreased. Lactate cleared 0.6. TSH elevated, normal T4.   Extubated 3/24, though combative and agitated. Increased sedation due to agitation  - continue to monitor, wean sedation as tolerated  - consider psych consult for capacity assessment when appropriate.     #Hypothermia - RESOLVED  Patient w/ temp of 94.6 degrees F on admisison to [facility]. After warming, repeat temp at 97.9 degrees F.     Pulmonary    #Intubated - RESOLVED  Intubated for airway protection and respiratory failure at [facility]. Extubated 3/24 AM.       #Acute hypercarbic respiratory failure - RESOLVING  ABG w/ pH 7.18, pCO2 74 on admission. Requiring intubation for altered mental status and respiratory failure at [facility]. Differential includes drug intoxication causing respiratory depression causing respiratory acidosis.   - extubated on 3/24, still requiring NC. Wean as tolerated.     Cardiovascular  #Hypotension  Patient w/ soft BPs at [facility]- 92/53 on admission. On levophed  - Titrate levophed to MAP 65-70    Renal    #Elevated creatinine, unknown baseline - RESOLVED  On admission, Cr 1.29 --> 1.03    Heme    #Anemia  Hemoglobin 12.1. No signs of bleeding on exam.  - continue to trend hgb  - active type and screen  - transfuse for hgb <7    ID    #R/o aspiration pneumonia  Patient w/ altered mental status, intubated for airway protection. New leukocytosis w/ WBC elevated to 13.9, febrile to 101 degrees F.   - trend wbc  - start CTX 2g qd  - f/u blood cultures  - f/u urine culture    Preventive measures  F: None  E: replete as necessary  D: NPO  DVT: SCDs

## 2023-03-24 NOTE — DIETITIAN INITIAL EVALUATION ADULT - OTHER CALCULATIONS
actual body weight used for all calculations as no height noted in EMR. Needs adjusted for current clinical course.

## 2023-03-24 NOTE — EEG REPORT - NS EEG TEXT BOX
[facility] Department of Neurology  Inpatient Continuous video-Electroencephalogram    Patient Name:	BERNARD GAONA    :	1983  MRN:	[id number]    Study Start Date/Time:  3/23/2023, 7:33:12 AM  Study End Date/Time:  3/23/2023, 6:18 PM    Referred by: Mily Gaston MD    Brief Clinical History:  BERNARD GAONA is a 40 year old Male; study performed to investigate for seizures or markers of epilepsy.    Diagnosis Code:   R56.9 convulsions/seizure  The live video was: monitored intermittently by trained technicians.    Pertinent Medications:  Propofol    Acquisition Details:  Electroencephalography was acquired using a minimum of 21 channels on an [employer] system v 9.3.1 with electrode placement according to the standard International 10-20 system following ACNS (American Clinical Neurophysiology Society) guidelines for Long-Term Video EEG monitoring.  Anterior temporal T1 and T2 electrodes were utilized whenever possible.   The [employer] automated spike & seizure detections were all reviewed in detail, in addition to extensive portions of raw EEG.    Background:  continuous, with predominantly delta with overriding beta frequencies.  Symmetry:  No persistent asymmetries of voltage or frequency.  Posterior Dominant Rhythm:  No clear PDR   Organization: Rudimentary.  Voltage:  Normal (20+ uV)  Variability: Yes. 		Reactivity: Yes.  N2 sleep: Symmetric, synchronous spindles and K complexes.  Spontaneous Activity:  No epileptiform discharges.  Periodic/rhythmic activity:  None  Events:  No electrographic seizures or significant clinical events.  Provocations:  Hyperventilation and Photic stimulation: was not performed.    Summary:    Abnormal continuous EEG:  Generalized background slowing and rudimentary background organization, indicating diffuse or multifocal cerebral dysfunction.  Sedating medications may contribute to these findings.  No epileptiform activity and no significant clinical events occurred.    Clinical Correlation:  There were no findings of active epilepsy, however this alone does not rule out the diagnosis.          Leti Gasca MD  Attending Neurologist, [facility]

## 2023-03-24 NOTE — PROGRESS NOTE ADULT - SUBJECTIVE AND OBJECTIVE BOX
OVERNIGHT EVENTS:    SUBJECTIVE / INTERVAL HPI: Patient seen and examined at bedside. Intubated & sedated, though appeared uncomfortable. Frequently fighting tube, not following commands.     VITAL SIGNS:  Vital Signs Last 24 Hrs  T(C): 37.5 (24 Mar 2023 09:38), Max: 38.6 (24 Mar 2023 04:00)  T(F): 99.5 (24 Mar 2023 09:38), Max: 101.5 (24 Mar 2023 04:00)  HR: 56 (24 Mar 2023 13:00) (56 - 103)  BP: 114/67 (24 Mar 2023 13:00) (66/38 - 120/74)  BP(mean): 85 (24 Mar 2023 13:00) (46 - 94)  RR: 16 (24 Mar 2023 13:00) (14 - 19)  SpO2: 100% (24 Mar 2023 13:00) (96% - 100%)    Parameters below as of 24 Mar 2023 12:00  Patient On (Oxygen Delivery Method): nasal cannula  O2 Flow (L/min): 6      PHYSICAL EXAM:    General: NAD  HEENT: NCAT  Neck: supple, trachea midline  Cardiovascular: S1, S2 normal; RRR, no M/G/R  Respiratory: CTABL; no W/R/R  Gastrointestinal: soft, nontender, nondistended. bowel sounds present.  Skin: no ulcerations or visible rashes appreciated  Extremities: WWP; no edema, clubbing or cyanosis  Vascular: 2+ radial, DP/PT pulses B/L  Neurological: AAOx3; CN II-XII grossly intact; no focal deficits    MEDICATIONS:  MEDICATIONS  (STANDING):  cefTRIAXone   IVPB 2000 milliGRAM(s) IV Intermittent every 24 hours  chlorhexidine 4% Liquid 1 Application(s) Topical <User Schedule>  dexMEDEtomidine Infusion 0.05 MICROgram(s)/kG/Hr (0.88 mL/Hr) IV Continuous <Continuous>  enoxaparin Injectable 40 milliGRAM(s) SubCutaneous every 24 hours  norepinephrine Infusion 0.05 MICROgram(s)/kG/Min (6.56 mL/Hr) IV Continuous <Continuous>    MEDICATIONS  (PRN):      ALLERGIES:  Allergies    Allergy Status Unknown    Intolerances        LABS:                        11.3   17.95 )-----------( 303      ( 24 Mar 2023 04:11 )             35.0     03-24    141  |  104  |  9   ----------------------------<  88  3.8   |  26  |  1.03    Ca    8.9      24 Mar 2023 04:11  Phos  2.4     03-24  Mg     1.6     03-24    TPro  5.8<L>  /  Alb  3.2<L>  /  TBili  0.5  /  DBili  x   /  AST  39  /  ALT  58<H>  /  AlkPhos  71  03-24    PT/INR - ( 23 Mar 2023 06:41 )   PT: 12.5 sec;   INR: 1.05          PTT - ( 23 Mar 2023 06:41 )  PTT:32.8 sec  Urinalysis Basic - ( 23 Mar 2023 00:44 )    Color: Yellow / Appearance: Clear / SG: >=1.030 / pH: x  Gluc: x / Ketone: NEGATIVE  / Bili: NEGATIVE / Urobili: 1.0 E.U./dL   Blood: x / Protein: 100 mg/dL / Nitrite: NEGATIVE   Leuk Esterase: NEGATIVE / RBC: > 10 /HPF / WBC < 5 /HPF   Sq Epi: x / Non Sq Epi: 0-5 /HPF / Bacteria: Present /HPF      CAPILLARY BLOOD GLUCOSE      POCT Blood Glucose.: 81 mg/dL (23 Mar 2023 11:55)      RADIOLOGY & ADDITIONAL TESTS: Reviewed. OVERNIGHT EVENTS:    SUBJECTIVE / INTERVAL HPI: Patient seen and examined at bedside. Intubated & sedated, though appeared uncomfortable. Frequently fighting tube, not following commands. Primary team at bedside - extubated patient ~7:45AM.     VITAL SIGNS:  Vital Signs Last 24 Hrs  T(C): 37.5 (24 Mar 2023 09:38), Max: 38.6 (24 Mar 2023 04:00)  T(F): 99.5 (24 Mar 2023 09:38), Max: 101.5 (24 Mar 2023 04:00)  HR: 56 (24 Mar 2023 13:00) (56 - 103)  BP: 114/67 (24 Mar 2023 13:00) (66/38 - 120/74)  BP(mean): 85 (24 Mar 2023 13:00) (46 - 94)  RR: 16 (24 Mar 2023 13:00) (14 - 19)  SpO2: 100% (24 Mar 2023 13:00) (96% - 100%)    Parameters below as of 24 Mar 2023 12:00  Patient On (Oxygen Delivery Method): nasal cannula  O2 Flow (L/min): 6      PHYSICAL EXAM:    General: Agitated, combative  HEENT: NCAT, anicteric sclera, trachea midline,   Cardiovascular: S1, S2 normal; RRR, no M/G/R  Respiratory: CTABL; no W/R/R  Gastrointestinal: soft, nontender, nondistended. bowel sounds present.  Extremities: WWP; no edema, clubbing or cyanosis.   Vascular: 2+ radial, DP/PT pulses B/L  Neurological: AAOx2-3 - difficult to assess mental status given agitation post intubation; CN II-XII grossly intact; no focal deficits    MEDICATIONS:  MEDICATIONS  (STANDING):  cefTRIAXone   IVPB 2000 milliGRAM(s) IV Intermittent every 24 hours  chlorhexidine 4% Liquid 1 Application(s) Topical <User Schedule>  dexMEDEtomidine Infusion 0.05 MICROgram(s)/kG/Hr (0.88 mL/Hr) IV Continuous <Continuous>  enoxaparin Injectable 40 milliGRAM(s) SubCutaneous every 24 hours  norepinephrine Infusion 0.05 MICROgram(s)/kG/Min (6.56 mL/Hr) IV Continuous <Continuous>    MEDICATIONS  (PRN):      ALLERGIES:  Allergies    Allergy Status Unknown    Intolerances        LABS:                        11.3   17.95 )-----------( 303      ( 24 Mar 2023 04:11 )             35.0     03-24    141  |  104  |  9   ----------------------------<  88  3.8   |  26  |  1.03    Ca    8.9      24 Mar 2023 04:11  Phos  2.4     03-24  Mg     1.6     03-24    TPro  5.8<L>  /  Alb  3.2<L>  /  TBili  0.5  /  DBili  x   /  AST  39  /  ALT  58<H>  /  AlkPhos  71  03-24    PT/INR - ( 23 Mar 2023 06:41 )   PT: 12.5 sec;   INR: 1.05          PTT - ( 23 Mar 2023 06:41 )  PTT:32.8 sec  Urinalysis Basic - ( 23 Mar 2023 00:44 )    Color: Yellow / Appearance: Clear / SG: >=1.030 / pH: x  Gluc: x / Ketone: NEGATIVE  / Bili: NEGATIVE / Urobili: 1.0 E.U./dL   Blood: x / Protein: 100 mg/dL / Nitrite: NEGATIVE   Leuk Esterase: NEGATIVE / RBC: > 10 /HPF / WBC < 5 /HPF   Sq Epi: x / Non Sq Epi: 0-5 /HPF / Bacteria: Present /HPF      CAPILLARY BLOOD GLUCOSE      POCT Blood Glucose.: 81 mg/dL (23 Mar 2023 11:55)      RADIOLOGY & ADDITIONAL TESTS: Reviewed.

## 2023-03-24 NOTE — DIETITIAN INITIAL EVALUATION ADULT - PERTINENT MEDS FT
MEDICATIONS  (STANDING):  cefTRIAXone   IVPB 2000 milliGRAM(s) IV Intermittent every 24 hours  chlorhexidine 4% Liquid 1 Application(s) Topical <User Schedule>  dexMEDEtomidine Infusion 0.05 MICROgram(s)/kG/Hr (0.88 mL/Hr) IV Continuous <Continuous>  enoxaparin Injectable 40 milliGRAM(s) SubCutaneous every 24 hours  norepinephrine Infusion 0.05 MICROgram(s)/kG/Min (6.56 mL/Hr) IV Continuous <Continuous>    MEDICATIONS  (PRN):

## 2023-03-24 NOTE — DIETITIAN INITIAL EVALUATION ADULT - NSFNSGIIOFT_GEN_A_CORE
03-23-23 @ 07:01  -  03-24-23 @ 07:00  --------------------------------------------------------  OUT:    Nasogastric/Oral tube (mL): 300 mL  Total OUT: 300 mL    Total NET: -300 mL      03-24-23 @ 07:01 - 03-24-23 @ 14:24  --------------------------------------------------------  OUT:    Nasogastric/Oral tube (mL): 100 mL  Total OUT: 100 mL    Total NET: -100 mL

## 2023-03-24 NOTE — DIETITIAN INITIAL EVALUATION ADULT - OTHER INFO
Patient is Male w/ no PMHx, unknown age, approximately 40 years old per [facility] ED, who presents for altered mental status, intubated at [facility] for airway protection, admitted for altered mental status due to drugs (?fentanyl) vs seizure vs trauma vs infectious etiology.    Pt seen at bedside for initial assessment- recently extubated; per RN pt agitated and resting thus interview deferred at this time. Observed pt on NC, precedex and levo drips- MAP 73. No BM noted since admission. NKFA documented. Dosing weight 154 pounds. No height in EMR; pt appeared 5'10. No edema documented at this time. No pressure ulcers documented at this time. Wiley score=11. Labs reviewed 3/24; pt hypophosphatemic. Observed pt with no overt signs of muscle or fat wasting. Based on ASPEN guidelines, pt does not meet criteria for malnutrition at this time. RD to follow up. See nutrition recommendations below.

## 2023-03-24 NOTE — DIETITIAN INITIAL EVALUATION ADULT - ADD RECOMMEND
1. Continue NPO status pending SLP evaluation for proper diet consistency; once cleared for PO diet recommend Regular diet   >>if EN is warranted consult RD for recommendations  2. Pain and bowel regimen per team   3. RD to obtain subjective information and provide diet ed as able   4. Monitor electrolytes; replete PRN  5. Consult RD for additional recommendations

## 2023-03-25 LAB
ALBUMIN SERPL ELPH-MCNC: 3.1 G/DL — LOW (ref 3.3–5)
ALP SERPL-CCNC: 76 U/L — SIGNIFICANT CHANGE UP (ref 40–120)
ALT FLD-CCNC: 42 U/L — SIGNIFICANT CHANGE UP (ref 10–45)
ANION GAP SERPL CALC-SCNC: 10 MMOL/L — SIGNIFICANT CHANGE UP (ref 5–17)
AST SERPL-CCNC: 23 U/L — SIGNIFICANT CHANGE UP (ref 10–40)
BASOPHILS # BLD AUTO: 0.01 K/UL — SIGNIFICANT CHANGE UP (ref 0–0.2)
BASOPHILS NFR BLD AUTO: 0.1 % — SIGNIFICANT CHANGE UP (ref 0–2)
BILIRUB SERPL-MCNC: 0.4 MG/DL — SIGNIFICANT CHANGE UP (ref 0.2–1.2)
BUN SERPL-MCNC: 10 MG/DL — SIGNIFICANT CHANGE UP (ref 7–23)
CALCIUM SERPL-MCNC: 9.1 MG/DL — SIGNIFICANT CHANGE UP (ref 8.4–10.5)
CHLORIDE SERPL-SCNC: 104 MMOL/L — SIGNIFICANT CHANGE UP (ref 96–108)
CO2 SERPL-SCNC: 25 MMOL/L — SIGNIFICANT CHANGE UP (ref 22–31)
CREAT SERPL-MCNC: 0.75 MG/DL — SIGNIFICANT CHANGE UP (ref 0.5–1.3)
EGFR: 118 ML/MIN/1.73M2 — SIGNIFICANT CHANGE UP
EOSINOPHIL # BLD AUTO: 0.14 K/UL — SIGNIFICANT CHANGE UP (ref 0–0.5)
EOSINOPHIL NFR BLD AUTO: 1 % — SIGNIFICANT CHANGE UP (ref 0–6)
GLUCOSE SERPL-MCNC: 103 MG/DL — HIGH (ref 70–99)
HCT VFR BLD CALC: 36.1 % — LOW (ref 39–50)
HGB BLD-MCNC: 11.7 G/DL — LOW (ref 13–17)
IMM GRANULOCYTES NFR BLD AUTO: 0.6 % — SIGNIFICANT CHANGE UP (ref 0–0.9)
LYMPHOCYTES # BLD AUTO: 1.1 K/UL — SIGNIFICANT CHANGE UP (ref 1–3.3)
LYMPHOCYTES # BLD AUTO: 7.8 % — LOW (ref 13–44)
MAGNESIUM SERPL-MCNC: 1.8 MG/DL — SIGNIFICANT CHANGE UP (ref 1.6–2.6)
MCHC RBC-ENTMCNC: 32 PG — SIGNIFICANT CHANGE UP (ref 27–34)
MCHC RBC-ENTMCNC: 32.4 GM/DL — SIGNIFICANT CHANGE UP (ref 32–36)
MCV RBC AUTO: 98.6 FL — SIGNIFICANT CHANGE UP (ref 80–100)
MONOCYTES # BLD AUTO: 0.32 K/UL — SIGNIFICANT CHANGE UP (ref 0–0.9)
MONOCYTES NFR BLD AUTO: 2.3 % — SIGNIFICANT CHANGE UP (ref 2–14)
NEUTROPHILS # BLD AUTO: 12.43 K/UL — HIGH (ref 1.8–7.4)
NEUTROPHILS NFR BLD AUTO: 88.2 % — HIGH (ref 43–77)
NRBC # BLD: 0 /100 WBCS — SIGNIFICANT CHANGE UP (ref 0–0)
PHOSPHATE SERPL-MCNC: 3.5 MG/DL — SIGNIFICANT CHANGE UP (ref 2.5–4.5)
PLATELET # BLD AUTO: 269 K/UL — SIGNIFICANT CHANGE UP (ref 150–400)
POTASSIUM SERPL-MCNC: 4.7 MMOL/L — SIGNIFICANT CHANGE UP (ref 3.5–5.3)
POTASSIUM SERPL-SCNC: 4.7 MMOL/L — SIGNIFICANT CHANGE UP (ref 3.5–5.3)
PROT SERPL-MCNC: 6.4 G/DL — SIGNIFICANT CHANGE UP (ref 6–8.3)
RBC # BLD: 3.66 M/UL — LOW (ref 4.2–5.8)
RBC # FLD: 13.6 % — SIGNIFICANT CHANGE UP (ref 10.3–14.5)
SODIUM SERPL-SCNC: 139 MMOL/L — SIGNIFICANT CHANGE UP (ref 135–145)
WBC # BLD: 14.09 K/UL — HIGH (ref 3.8–10.5)
WBC # FLD AUTO: 14.09 K/UL — HIGH (ref 3.8–10.5)

## 2023-03-25 PROCEDURE — 90792 PSYCH DIAG EVAL W/MED SRVCS: CPT

## 2023-03-25 PROCEDURE — 51701 INSERT BLADDER CATHETER: CPT

## 2023-03-25 PROCEDURE — 76937 US GUIDE VASCULAR ACCESS: CPT | Mod: 26

## 2023-03-25 PROCEDURE — 99233 SBSQ HOSP IP/OBS HIGH 50: CPT | Mod: GC

## 2023-03-25 PROCEDURE — 36000 PLACE NEEDLE IN VEIN: CPT

## 2023-03-25 RX ORDER — OLANZAPINE 15 MG/1
5 TABLET, FILM COATED ORAL ONCE
Refills: 0 | Status: COMPLETED | OUTPATIENT
Start: 2023-03-25 | End: 2023-03-25

## 2023-03-25 RX ADMIN — Medication 2 MILLIGRAM(S): at 05:55

## 2023-03-25 RX ADMIN — DEXMEDETOMIDINE HYDROCHLORIDE IN 0.9% SODIUM CHLORIDE 0.88 MICROGRAM(S)/KG/HR: 4 INJECTION INTRAVENOUS at 14:10

## 2023-03-25 RX ADMIN — ENOXAPARIN SODIUM 40 MILLIGRAM(S): 100 INJECTION SUBCUTANEOUS at 10:20

## 2023-03-25 RX ADMIN — OLANZAPINE 5 MILLIGRAM(S): 15 TABLET, FILM COATED ORAL at 14:10

## 2023-03-25 RX ADMIN — DEXMEDETOMIDINE HYDROCHLORIDE IN 0.9% SODIUM CHLORIDE 0.88 MICROGRAM(S)/KG/HR: 4 INJECTION INTRAVENOUS at 03:05

## 2023-03-25 RX ADMIN — Medication 2 MILLIGRAM(S): at 18:15

## 2023-03-25 RX ADMIN — HALOPERIDOL DECANOATE 5 MILLIGRAM(S): 100 INJECTION INTRAMUSCULAR at 18:15

## 2023-03-25 RX ADMIN — Medication 2 MILLIGRAM(S): at 14:55

## 2023-03-25 RX ADMIN — CEFTRIAXONE 100 MILLIGRAM(S): 500 INJECTION, POWDER, FOR SOLUTION INTRAMUSCULAR; INTRAVENOUS at 05:00

## 2023-03-25 RX ADMIN — DEXMEDETOMIDINE HYDROCHLORIDE IN 0.9% SODIUM CHLORIDE 0.88 MICROGRAM(S)/KG/HR: 4 INJECTION INTRAVENOUS at 21:29

## 2023-03-25 RX ADMIN — HALOPERIDOL DECANOATE 5 MILLIGRAM(S): 100 INJECTION INTRAMUSCULAR at 22:48

## 2023-03-25 RX ADMIN — DEXMEDETOMIDINE HYDROCHLORIDE IN 0.9% SODIUM CHLORIDE 0.88 MICROGRAM(S)/KG/HR: 4 INJECTION INTRAVENOUS at 14:15

## 2023-03-25 RX ADMIN — HALOPERIDOL DECANOATE 5 MILLIGRAM(S): 100 INJECTION INTRAMUSCULAR at 15:30

## 2023-03-25 RX ADMIN — DEXMEDETOMIDINE HYDROCHLORIDE IN 0.9% SODIUM CHLORIDE 0.88 MICROGRAM(S)/KG/HR: 4 INJECTION INTRAVENOUS at 17:57

## 2023-03-25 RX ADMIN — CHLORHEXIDINE GLUCONATE 1 APPLICATION(S): 213 SOLUTION TOPICAL at 06:00

## 2023-03-25 RX ADMIN — DEXMEDETOMIDINE HYDROCHLORIDE IN 0.9% SODIUM CHLORIDE 0.88 MICROGRAM(S)/KG/HR: 4 INJECTION INTRAVENOUS at 11:30

## 2023-03-25 RX ADMIN — Medication 2 MILLIGRAM(S): at 22:48

## 2023-03-25 NOTE — CHART NOTE - NSCHARTNOTEFT_GEN_A_CORE
RN called this writer to bedside as patient with increased agitation and attempting to remove wrist restraints, patient able to breakthru L wrist restraint. During this episode patient cursing @ staff, swing arms attempting to hit staff and attempting to bite staff. Patient pulled out R PIV, cardiac monitor and SPO2 monitoring. Code kasper called security at bedside. Ativan 2mg IVP given x 1 w appropriate effect, dexmedetomidine GTT infusing and new USPIV inserted.   Patient placed back on cardiac monitor with ECO2 monitoring.

## 2023-03-25 NOTE — DOWNTIME INTERRUPTION NOTE - WHICH MANUAL FORMS INITIATED?
[facility] unavailable from 3/24/23 from 10pm - 3/25/23 10 am; see paper records for clinical information written during SCM downtime.

## 2023-03-25 NOTE — BH CONSULTATION LIAISON ASSESSMENT NOTE - SUMMARY
Patient is an unknown man (was able to state his name was Henry Shah (n)  1985), found down, intubated/sedated at [facility] and transferred to [facility] for further management, now extubated but unable to provide history due to sedation. Psychiatry consulted as patient wanted to leave yesterday, question of capacity to leave AMA, despite him remaining delirious and sedated. Patient seen today, appears sedated, able to answer some questions but ultimately unable to sustain attention. He denied any desire to harm himself and is unsure of how he ended up in the hospital. Not requesting to leave at this time, and amenable to being cared for in his vulnerable state. He is unable to participate in capacity evaluation given his level of cognitive impairment at this time.     RECOMMENDATIONS:  --Cannot evaluate capacity at this time given level of cognitive impairment; his incredibly delirious state informally suggests he may not have capacity to leave AMA but full evaluation unable to be conducted  --Consider enhanced supervision given delirium and unpredictability  --Can give Haldol 5mg IM/IV/PO q6hr prn acute agitation not responsive to verbal redirection; hold for over-sedation  --Monitor EKG to assess QTc; hold antipsychotics if QTc >500ms  --Will follow to continue to assess for safety

## 2023-03-25 NOTE — PROCEDURE NOTE - NSPROCNAME_GEN_A_CORE
Point of Care Ultrasound Other
Point of Care Ultrasound Cardiac
Point of Care Ultrasound Lung
Peripheral Line Insertion
Urinary Device Placement

## 2023-03-25 NOTE — BH CONSULTATION LIAISON ASSESSMENT NOTE - HPI (INCLUDE ILLNESS QUALITY, SEVERITY, DURATION, TIMING, CONTEXT, MODIFYING FACTORS, ASSOCIATED SIGNS AND SYMPTOMS)
Patient is an unknown man (was able to state his name was Henry Shah (n)  1985), found down, intubated/sedated at [facility] and transferred to [facility] for further management, now extubated but unable to provide history due to sedation. Psychiatry consulted as patient wanted to leave yesterday, question of capacity to leave AMA, despite him remaining delirious and sedated. Patient seen today, appears sedated, able to answer some questions but ultimately unable to sustain attention. Provided his name and , unable to find in [facility] chart otherwise. He said he does not know what happened or where he is, then spontaneously said "I don't want to ride with you guys", redirected to assuring him he was safe in the hospital. Cannot discuss medical treatment but assenting to remain in bed and be taken care of given his vulnerable state at this time. He denies any desire to overdose on any drugs prior to arrival at the hospital, denied any current SI. Otherwise unable to participate meaningfully in psychiatric assessment given sedation/delirium.

## 2023-03-25 NOTE — BH CONSULTATION LIAISON ASSESSMENT NOTE - CURRENT MEDICATION
MEDICATIONS  (STANDING):  cefTRIAXone   IVPB 2000 milliGRAM(s) IV Intermittent every 24 hours  chlorhexidine 4% Liquid 1 Application(s) Topical <User Schedule>  dexMEDEtomidine Infusion 0.05 MICROgram(s)/kG/Hr (0.88 mL/Hr) IV Continuous <Continuous>  enoxaparin Injectable 40 milliGRAM(s) SubCutaneous every 24 hours  norepinephrine Infusion 0.05 MICROgram(s)/kG/Min (6.56 mL/Hr) IV Continuous <Continuous>    MEDICATIONS  (PRN):  haloperidol    Injectable 5 milliGRAM(s) IV Push every 4 hours PRN agitation  LORazepam   Injectable 2 milliGRAM(s) IV Push every 4 hours PRN Agitation

## 2023-03-25 NOTE — BH CONSULTATION LIAISON ASSESSMENT NOTE - NSBHCHARTREVIEWVS_PSY_A_CORE FT
Vital Signs Last 24 Hrs  T(C): 36.1 (25 Mar 2023 09:03), Max: 36.7 (24 Mar 2023 14:42)  T(F): 97 (25 Mar 2023 09:03), Max: 98 (24 Mar 2023 14:42)  HR: 51 (24 Mar 2023 22:00) (50 - 68)  BP: 116/76 (24 Mar 2023 22:00) (90/54 - 116/76)  BP(mean): 92 (24 Mar 2023 22:00) (66 - 92)  RR: 27 (24 Mar 2023 22:00) (12 - 27)  SpO2: 100% (24 Mar 2023 22:00) (92% - 100%)    Parameters below as of 24 Mar 2023 22:00  Patient On (Oxygen Delivery Method): nasal cannula w/ humidification  O2 Flow (L/min): 2

## 2023-03-25 NOTE — PROCEDURE NOTE - NSICDXPROCEDURE_GEN_ALL_CORE_FT
PROCEDURES:  Insertion of intravenous catheter with ultrasound guidance 25-Mar-2023 15:15:24  Mir Plata  
PROCEDURES:  Indwelling urinary Garcia catheter 25-Mar-2023 23:23:54  Mir Plata

## 2023-03-25 NOTE — PROGRESS NOTE ADULT - ASSESSMENT
Patient is Male w/ no PMHx, unknown age, approximately 40 years old per [facility] ED, who presents for altered mental status, intubated at [facility] for airway protection, admitted for altered mental status due to drugs (?fentanyl) vs seizure vs trauma vs infectious etiology.    Neuro    #Altered mental status - RESOLVING  Differential includes seizures vs trauma vs drug-induced encephalopathy. Patient found on bus with altered mental status. Received Narcan x3. UTox + for THC and cocaine. Per ED signout- also, found w/ white powder on him. Lactate elevated to 2.7. vEEG w/o evidence of seizure. CT Head unrevealing. CPK elevated 468, now decreased. Lactate cleared 0.6. TSH elevated, normal T4.   Extubated 3/24, though combative and agitated. Increased sedation due to agitation  - continue to monitor  - As per psych can give Haldol 5mg IM/IV/PO q6hr prn acute agitation not responsive to verbal redirection; hold for over-sedation  - Unable to assess capacity while in delirious state      #Hypothermia - RESOLVED  Patient w/ temp of 94.6 degrees F on admission to [facility]. After warming, repeat temp at 97.9 degrees F.     Pulmonary    #Intubated - RESOLVED  Intubated for airway protection and respiratory failure at [facility]. Extubated 3/24 AM.       #Acute hypercarbic respiratory failure - RESOLVING  ABG w/ pH 7.18, pCO2 74 on admission. Requiring intubation for altered mental status and respiratory failure at [facility]. Differential includes drug intoxication causing respiratory depression causing respiratory acidosis.   - extubated on 3/24, currently on RA    Cardiovascular  #Hypotension  Patient w/ soft BPs at [facility]- 92/53 on admission. On levophed  - Titrate levophed to MAP 65-70    Renal    #Elevated creatinine, unknown baseline - RESOLVED  On admission, Cr 1.29 --> 1.03 --> 0.78    Heme    #Anemia  Hemoglobin 12.1. No signs of bleeding on exam.  - continue to trend hgb  - active type and screen  - transfuse for hgb <7    ID    #R/o aspiration pneumonia  Patient w/ altered mental status, intubated for airway protection. New leukocytosis w/ WBC elevated to 13.9, febrile to 101 degrees F.   - trend wbc  - C/w CTX 2g qd  - f/u blood cultures  - f/u urine culture    Preventive measures  F: None  E: replete as necessary  D: NPO  DVT: SCDs

## 2023-03-25 NOTE — PROCEDURE NOTE - NSPROCDETAILS_GEN_ALL_CORE
location identified, draped/prepped, sterile technique used/blood seen on insertion/dressing applied/flushes easily/secured in place/sterile technique, catheter placed
sterile technique, indwelling urinary device inserted/sterile technique, non-indwelling urinary device inserted/prior to placement, an active physician order for the placement of a urinary catheter was verified/a urinary catheter insertion kit was used for all insertion materials

## 2023-03-25 NOTE — PROCEDURE NOTE - ADDITIONAL PROCEDURE DETAILS
USGPIV inserted to left AC 20g 1.88inch, x1 attempt and patient tolerated well.
Bladder scan >700mL RN attempted to straight cath patient x 2 w/o success. Indwelling cali catheter placed via sterile technique, 16fr, x1 attempt, patient tolerated well and clear/yellow urine draining s/p catheter insertion.

## 2023-03-25 NOTE — PROGRESS NOTE ADULT - SUBJECTIVE AND OBJECTIVE BOX
OVERNIGHT EVENTS: SCOTT, pt calm overnight, however +somnolence.    SUBJECTIVE:  Patient seen and examined at bedside.  ROS: Pt too somnolent to answer questions    Vital Signs Last 12 Hrs  T(F): 96.8 (03-25-23 @ 14:29), Max: 97 (03-25-23 @ 09:03)  HR: 52 (03-25-23 @ 13:00) (48 - 58)  BP: 128/71 (03-25-23 @ 13:00) (124/75 - 136/83)  BP(mean): 93 (03-25-23 @ 13:00) (93 - 106)  RR: 13 (03-25-23 @ 13:00) (13 - 19)  SpO2: 99% (03-25-23 @ 13:00) (99% - 100%)  I&O's Summary    24 Mar 2023 07:01  -  25 Mar 2023 07:00  --------------------------------------------------------  IN: 622 mL / OUT: 735 mL / NET: -113 mL        PHYSICAL EXAM:  Constitutional: NAD, comfortable in bed.  HEENT: NC/AT, PERRLA, EOMI, no conjunctival pallor or scleral icterus, MMM  Neck: Supple, no JVD  Respiratory: CTA B/L. No w/r/r.   Cardiovascular: RRR, normal S1 and S2, no m/r/g.   Gastrointestinal: +BS, soft NTND, no guarding or rebound tenderness, no palpable masses   Extremities: wwp; no cyanosis, clubbing or edema.   Vascular: Pulses equal and strong throughout.   Neurological: Unable to assess orientation due to somnolence. Responds to physical stimulation.   Skin: No gross skin abnormalities or rashes        LABS:                        11.7   14.09 )-----------( 269      ( 25 Mar 2023 05:59 )             36.1     03-25    139  |  104  |  10  ----------------------------<  103<H>  4.7   |  25  |  0.75    Ca    9.1      25 Mar 2023 05:59  Phos  3.5     03-25  Mg     1.8     03-25    TPro  6.4  /  Alb  3.1<L>  /  TBili  0.4  /  DBili  x   /  AST  23  /  ALT  42  /  AlkPhos  76  03-25            RADIOLOGY & ADDITIONAL TESTS:    MEDICATIONS  (STANDING):  cefTRIAXone   IVPB 2000 milliGRAM(s) IV Intermittent every 24 hours  chlorhexidine 4% Liquid 1 Application(s) Topical <User Schedule>  dexMEDEtomidine Infusion 0.05 MICROgram(s)/kG/Hr (0.88 mL/Hr) IV Continuous <Continuous>  enoxaparin Injectable 40 milliGRAM(s) SubCutaneous every 24 hours  norepinephrine Infusion 0.05 MICROgram(s)/kG/Min (6.56 mL/Hr) IV Continuous <Continuous>    MEDICATIONS  (PRN):  haloperidol    Injectable 5 milliGRAM(s) IV Push every 4 hours PRN agitation  LORazepam   Injectable 2 milliGRAM(s) IV Push every 4 hours PRN Agitation

## 2023-03-26 LAB
-  CEFTRIAXONE: SIGNIFICANT CHANGE UP
-  CLINDAMYCIN: SIGNIFICANT CHANGE UP
-  ERYTHROMYCIN: SIGNIFICANT CHANGE UP
-  PENICILLIN: SIGNIFICANT CHANGE UP
ALBUMIN SERPL ELPH-MCNC: 3 G/DL — LOW (ref 3.3–5)
ALP SERPL-CCNC: 77 U/L — SIGNIFICANT CHANGE UP (ref 40–120)
ALT FLD-CCNC: 33 U/L — SIGNIFICANT CHANGE UP (ref 10–45)
ANION GAP SERPL CALC-SCNC: 10 MMOL/L — SIGNIFICANT CHANGE UP (ref 5–17)
AST SERPL-CCNC: 16 U/L — SIGNIFICANT CHANGE UP (ref 10–40)
BASOPHILS # BLD AUTO: 0.01 K/UL — SIGNIFICANT CHANGE UP (ref 0–0.2)
BASOPHILS NFR BLD AUTO: 0.1 % — SIGNIFICANT CHANGE UP (ref 0–2)
BILIRUB SERPL-MCNC: 0.4 MG/DL — SIGNIFICANT CHANGE UP (ref 0.2–1.2)
BUN SERPL-MCNC: 7 MG/DL — SIGNIFICANT CHANGE UP (ref 7–23)
CALCIUM SERPL-MCNC: 9 MG/DL — SIGNIFICANT CHANGE UP (ref 8.4–10.5)
CHLORIDE SERPL-SCNC: 108 MMOL/L — SIGNIFICANT CHANGE UP (ref 96–108)
CO2 SERPL-SCNC: 26 MMOL/L — SIGNIFICANT CHANGE UP (ref 22–31)
CREAT SERPL-MCNC: 0.68 MG/DL — SIGNIFICANT CHANGE UP (ref 0.5–1.3)
CULTURE RESULTS: SIGNIFICANT CHANGE UP
EGFR: 122 ML/MIN/1.73M2 — SIGNIFICANT CHANGE UP
EOSINOPHIL # BLD AUTO: 0.09 K/UL — SIGNIFICANT CHANGE UP (ref 0–0.5)
EOSINOPHIL NFR BLD AUTO: 0.9 % — SIGNIFICANT CHANGE UP (ref 0–6)
GLUCOSE SERPL-MCNC: 104 MG/DL — HIGH (ref 70–99)
HCT VFR BLD CALC: 41.1 % — SIGNIFICANT CHANGE UP (ref 39–50)
HGB BLD-MCNC: 13.3 G/DL — SIGNIFICANT CHANGE UP (ref 13–17)
IMM GRANULOCYTES NFR BLD AUTO: 0.3 % — SIGNIFICANT CHANGE UP (ref 0–0.9)
LYMPHOCYTES # BLD AUTO: 0.96 K/UL — LOW (ref 1–3.3)
LYMPHOCYTES # BLD AUTO: 10.1 % — LOW (ref 13–44)
MAGNESIUM SERPL-MCNC: 1.6 MG/DL — SIGNIFICANT CHANGE UP (ref 1.6–2.6)
MCHC RBC-ENTMCNC: 31.3 PG — SIGNIFICANT CHANGE UP (ref 27–34)
MCHC RBC-ENTMCNC: 32.4 GM/DL — SIGNIFICANT CHANGE UP (ref 32–36)
MCV RBC AUTO: 96.7 FL — SIGNIFICANT CHANGE UP (ref 80–100)
METHOD TYPE: SIGNIFICANT CHANGE UP
METHOD TYPE: SIGNIFICANT CHANGE UP
MONOCYTES # BLD AUTO: 0.42 K/UL — SIGNIFICANT CHANGE UP (ref 0–0.9)
MONOCYTES NFR BLD AUTO: 4.4 % — SIGNIFICANT CHANGE UP (ref 2–14)
NEUTROPHILS # BLD AUTO: 8.04 K/UL — HIGH (ref 1.8–7.4)
NEUTROPHILS NFR BLD AUTO: 84.2 % — HIGH (ref 43–77)
NRBC # BLD: 0 /100 WBCS — SIGNIFICANT CHANGE UP (ref 0–0)
ORGANISM # SPEC MICROSCOPIC CNT: SIGNIFICANT CHANGE UP
PHOSPHATE SERPL-MCNC: 3.7 MG/DL — SIGNIFICANT CHANGE UP (ref 2.5–4.5)
PLATELET # BLD AUTO: 301 K/UL — SIGNIFICANT CHANGE UP (ref 150–400)
POTASSIUM SERPL-MCNC: 3.5 MMOL/L — SIGNIFICANT CHANGE UP (ref 3.5–5.3)
POTASSIUM SERPL-SCNC: 3.5 MMOL/L — SIGNIFICANT CHANGE UP (ref 3.5–5.3)
PROT SERPL-MCNC: 6.5 G/DL — SIGNIFICANT CHANGE UP (ref 6–8.3)
RBC # BLD: 4.25 M/UL — SIGNIFICANT CHANGE UP (ref 4.2–5.8)
RBC # FLD: 13.2 % — SIGNIFICANT CHANGE UP (ref 10.3–14.5)
SODIUM SERPL-SCNC: 144 MMOL/L — SIGNIFICANT CHANGE UP (ref 135–145)
SPECIMEN SOURCE: SIGNIFICANT CHANGE UP
WBC # BLD: 9.55 K/UL — SIGNIFICANT CHANGE UP (ref 3.8–10.5)
WBC # FLD AUTO: 9.55 K/UL — SIGNIFICANT CHANGE UP (ref 3.8–10.5)

## 2023-03-26 PROCEDURE — 99233 SBSQ HOSP IP/OBS HIGH 50: CPT | Mod: GC

## 2023-03-26 RX ORDER — MAGNESIUM SULFATE 500 MG/ML
2 VIAL (ML) INJECTION ONCE
Refills: 0 | Status: COMPLETED | OUTPATIENT
Start: 2023-03-26 | End: 2023-03-26

## 2023-03-26 RX ORDER — HALOPERIDOL DECANOATE 100 MG/ML
5 INJECTION INTRAMUSCULAR ONCE
Refills: 0 | Status: COMPLETED | OUTPATIENT
Start: 2023-03-26 | End: 2023-03-26

## 2023-03-26 RX ORDER — POTASSIUM CHLORIDE 20 MEQ
10 PACKET (EA) ORAL
Refills: 0 | Status: COMPLETED | OUTPATIENT
Start: 2023-03-26 | End: 2023-03-26

## 2023-03-26 RX ADMIN — Medication 1 MILLIGRAM(S): at 02:36

## 2023-03-26 RX ADMIN — Medication 2 MILLIGRAM(S): at 11:57

## 2023-03-26 RX ADMIN — HALOPERIDOL DECANOATE 5 MILLIGRAM(S): 100 INJECTION INTRAMUSCULAR at 02:32

## 2023-03-26 RX ADMIN — Medication 100 MILLIEQUIVALENT(S): at 09:50

## 2023-03-26 RX ADMIN — CEFTRIAXONE 100 MILLIGRAM(S): 500 INJECTION, POWDER, FOR SOLUTION INTRAMUSCULAR; INTRAVENOUS at 05:01

## 2023-03-26 RX ADMIN — HALOPERIDOL DECANOATE 5 MILLIGRAM(S): 100 INJECTION INTRAMUSCULAR at 11:56

## 2023-03-26 RX ADMIN — Medication 100 MILLIEQUIVALENT(S): at 08:10

## 2023-03-26 RX ADMIN — Medication 100 MILLIEQUIVALENT(S): at 06:49

## 2023-03-26 RX ADMIN — ENOXAPARIN SODIUM 40 MILLIGRAM(S): 100 INJECTION SUBCUTANEOUS at 10:30

## 2023-03-26 RX ADMIN — DEXMEDETOMIDINE HYDROCHLORIDE IN 0.9% SODIUM CHLORIDE 0.88 MICROGRAM(S)/KG/HR: 4 INJECTION INTRAVENOUS at 01:46

## 2023-03-26 RX ADMIN — Medication 2 MILLIGRAM(S): at 01:00

## 2023-03-26 RX ADMIN — CHLORHEXIDINE GLUCONATE 1 APPLICATION(S): 213 SOLUTION TOPICAL at 06:47

## 2023-03-26 RX ADMIN — Medication 25 GRAM(S): at 07:19

## 2023-03-26 RX ADMIN — HALOPERIDOL DECANOATE 5 MILLIGRAM(S): 100 INJECTION INTRAMUSCULAR at 01:00

## 2023-03-26 NOTE — BH CONSULTATION LIAISON PROGRESS NOTE - NSBHASSESSMENTFT_PSY_ALL_CORE
37 year old male, (Henry Shah (n);  1985), found down, intubated/sedated at [facility] and transferred to [facility] for further management, extubated on the morning of 23 but unable to provide history due to sedation. Psychiatry consulted as patient wanted to leave on 23, question of capacity to leave AMA, despite him remaining delirious and sedated. Patient seen today, appears sedated, able to answer some questions but ultimately unable to sustain attention. He denied any desire to harm himself and is unsure of how he ended up in the hospital. Not requesting to leave at this time, and amenable to being cared for in his vulnerable state. He is unable to participate in capacity evaluation given his level of cognitive impairment at this time.     RECOMMENDATIONS:  --Cannot evaluate capacity at this time given level of cognitive impairment; his incredibly delirious state informally suggests he may not have capacity to leave AMA but full evaluation unable to be conducted  --Consider enhanced supervision given delirium and unpredictability  --Can give Haldol 5 mg IM/IV/PO q6hr prn acute agitation not responsive to verbal redirection; hold for over-sedation  --Monitor EKG to assess QTc; hold antipsychotics if QTc >500ms  --Will follow to continue to assess for safety 37 year old male, (Henry Shah (n);  1985), found down, intubated/sedated at [facility] and transferred to [facility] for further management, extubated on the morning of 23 but unable to provide history due to sedation. Psychiatry consulted as patient wanted to leave on 23, question of capacity to leave AMA, despite him remaining delirious and sedated.   The patient was alert and engaging in the interview. On evaluation the patient stated that his name was "Rehan Back" was born 1985.  Patient was unable to provided orientation to place, time, and situation. While evaluating the patient endorsed that he is a regularly user of K2 and crack cocaine. He states to support his drug use "I have to panhandle". Patient declined receiving any outpatient psychiatric help or any psychiatric inpatient admission. pt states that he "sorta remembers" what happens on the day he was admitted. He stated that the drugs "tasted funny". When asked for collateral he sated his father was named Cuate Back [phone]. The writer called the collateral, who stated that he does have a son named Rehan, who was recently released from halfway who is struggling with K2 and Crack addiction.     Pt states that he does not have any SI/HI, and at the time of assessment did not appear to be responding to any internal stimuli. Pt with acute and chronic RF for suicide including age, gender, substance, and perhaps others, though this is mitigated in part by various PF including help-seeking behavior, absence of SI, and likely others. No evidence that the pt is at an acutely elevated risk of harm to self or others at this time.    RECOMMENDATIONS:  --Cannot evaluate capacity at this time given level of cognitive impairment; his incredibly delirious state informally suggests he may not have capacity to leave AMA but full evaluation unable to be conducted  --Consider enhanced supervision given delirium and unpredictability  --Can give Haldol 5 mg IM/IV/PO q6hr prn acute agitation not responsive to verbal redirection; hold for over-sedation  --Monitor EKG to assess QTc; hold antipsychotics if QTc >500ms  --Will follow to continue to assess for safety

## 2023-03-26 NOTE — PROGRESS NOTE ADULT - ASSESSMENT
Patient is Male w/ no PMHx, unknown age, approximately 40 years old per [facility] ED, who presents for altered mental status, intubated at [facility] for airway protection, admitted for altered mental status due to drugs (?fentanyl) vs seizure vs trauma vs infectious etiology.    Neuro    #Altered mental status - RESOLVING  Differential includes seizures vs trauma vs drug-induced encephalopathy. Patient found on bus with altered mental status. Received Narcan x3. UTox + for THC and cocaine. Per ED signout- also, found w/ white powder on him. Lactate elevated to 2.7. vEEG w/o evidence of seizure. CT Head unrevealing. CPK elevated 468, now decreased. Lactate cleared 0.6. TSH elevated, normal T4.   Extubated 3/24, though combative and agitated. Increased sedation due to agitation  - continue to monitor  - As per psych can give Haldol 5mg IM/IV/PO q6hr prn acute agitation not responsive to verbal redirection; hold for over-sedation  - Unable to assess capacity while in delirious state      #Hypothermia - RESOLVED  Patient w/ temp of 94.6 degrees F on admission to [facility]. After warming, repeat temp at 97.9 degrees F.     Pulmonary    #Intubated - RESOLVED  Intubated for airway protection and respiratory failure at [facility]. Extubated 3/24 AM.       #Acute hypercarbic respiratory failure - RESOLVING  ABG w/ pH 7.18, pCO2 74 on admission. Requiring intubation for altered mental status and respiratory failure at [facility]. Differential includes drug intoxication causing respiratory depression causing respiratory acidosis.   - extubated on 3/24, currently on RA    Cardiovascular  #Hypotension  Patient w/ soft BPs at [facility]- 92/53 on admission. On levophed  - Titrate levophed to MAP 65-70    Renal    #Elevated creatinine, unknown baseline - RESOLVED  On admission, Cr 1.29 --> 1.03 --> 0.78    Heme    #Anemia  Hemoglobin 12.1. No signs of bleeding on exam.  - continue to trend hgb  - active type and screen  - transfuse for hgb <7    ID    #R/o aspiration pneumonia  Patient w/ altered mental status, intubated for airway protection. New leukocytosis w/ WBC elevated to 13.9, febrile to 101 degrees F.   - trend wbc  - C/w CTX 2g qd  - f/u blood cultures  - f/u urine culture    Preventive measures  F: None  E: replete as necessary  D: NPO  DVT: SCDs   Patient is Male w/ no PMHx, unknown age, approximately 40 years old per [facility] ED, who presents for altered mental status, intubated at [facility] for airway protection, admitted for altered mental status due to drugs (?fentanyl) vs seizure vs trauma vs infectious etiology.    Neuro    #Altered mental status - RESOLVING  Differential includes seizures vs trauma vs drug-induced encephalopathy. Patient found on bus with altered mental status. Received Narcan x3. UTox + for THC and cocaine. Per ED signout- also, found w/ white powder on him. Lactate elevated to 2.7. vEEG w/o evidence of seizure. CT Head unrevealing. CPK elevated 468, now decreased. Lactate cleared 0.6. TSH elevated, normal T4.   Extubated 3/24, though combative and agitated. Increased sedation due to agitation  - continue to monitor  - As per psych can give Haldol 5mg IM/IV/PO q6hr prn acute agitation not responsive to verbal redirection; hold for over-sedation  - daily EKGs  - Unable to assess capacity while in delirious state  - f/u Psych recs      #Hypothermia - RESOLVED  Patient w/ temp of 94.6 degrees F on admission to [facility]. After warming, repeat temp at 97.9 degrees F.     Pulmonary    #Intubated - RESOLVED  Intubated for airway protection and respiratory failure at [facility]. Extubated 3/24 AM.       #Acute hypercarbic respiratory failure - RESOLVING  ABG w/ pH 7.18, pCO2 74 on admission. Requiring intubation for altered mental status and respiratory failure at [facility]. Differential includes drug intoxication causing respiratory depression causing respiratory acidosis.   - extubated on 3/24, currently on RA    Cardiovascular  #Hypotension - RESOLVED  Patient w/ soft BPs at [facility]- 92/53 on admission. Weaned off levophed    Renal - AYLIN      Heme    #Anemia  Hemoglobin 12.1. No signs of bleeding on exam.  - continue to trend hgb  - active type and screen  - transfuse for hgb <7    ID    #R/o aspiration pneumonia  Patient w/ altered mental status, intubated for airway protection. New leukocytosis w/ WBC elevated to 13.9, febrile to 101 degrees F. ET tube culture +for strep pneumo  - trend wbc  - C/w CTX 2g qd  - f/u blood cultures (ngtd)  - f/u urine culture (ngtd)    Preventive measures  F: None  E: replete as necessary  D: NPO  DVT: SCDs

## 2023-03-26 NOTE — PROGRESS NOTE ADULT - SUBJECTIVE AND OBJECTIVE BOX
OVERNIGHT EVENTS: Blad scan 888, could not straight cath --> cali placed. Agitated, not redirectable, required haldol & ativan, put on vest as patient broke through wrist-restraints.    SUBJECTIVE / INTERVAL HPI: Patient seen and examined at bedside. Unable to assess ROS as     VITAL SIGNS:  Vital Signs Last 24 Hrs  T(C): 36.4 (26 Mar 2023 11:03), Max: 36.4 (26 Mar 2023 01:20)  T(F): 97.5 (26 Mar 2023 11:03), Max: 97.5 (26 Mar 2023 01:20)  HR: 54 (26 Mar 2023 12:00) (45 - 70)  BP: 133/85 (26 Mar 2023 12:00) (118/73 - 145/89)  BP(mean): 104 (26 Mar 2023 12:00) (93 - 115)  RR: 18 (26 Mar 2023 11:15) (13 - 26)  SpO2: 99% (26 Mar 2023 12:00) (95% - 100%)    Parameters below as of 26 Mar 2023 11:00  Patient On (Oxygen Delivery Method): room air        PHYSICAL EXAM:    General: NAD  HEENT: NCAT  Neck: supple, trachea midline  Cardiovascular: S1, S2 normal; RRR, no M/G/R  Respiratory: CTABL; no W/R/R  Gastrointestinal: soft, nontender, nondistended. bowel sounds present.  Skin: no ulcerations or visible rashes appreciated  Extremities: WWP; no edema, clubbing or cyanosis  Vascular: 2+ radial, DP/PT pulses B/L  Neurological: AAOx3; CN II-XII grossly intact; no focal deficits    MEDICATIONS:  MEDICATIONS  (STANDING):  cefTRIAXone   IVPB 2000 milliGRAM(s) IV Intermittent every 24 hours  chlorhexidine 4% Liquid 1 Application(s) Topical <User Schedule>  dexMEDEtomidine Infusion 0.05 MICROgram(s)/kG/Hr (0.88 mL/Hr) IV Continuous <Continuous>  enoxaparin Injectable 40 milliGRAM(s) SubCutaneous every 24 hours    MEDICATIONS  (PRN):  haloperidol    Injectable 5 milliGRAM(s) IV Push every 4 hours PRN agitation  LORazepam   Injectable 2 milliGRAM(s) IV Push every 4 hours PRN Agitation      ALLERGIES:  Allergies    Allergy Status Unknown    Intolerances        LABS:                        13.3   9.55  )-----------( 301      ( 26 Mar 2023 04:52 )             41.1     03-26    144  |  108  |  7   ----------------------------<  104<H>  3.5   |  26  |  0.68    Ca    9.0      26 Mar 2023 04:52  Phos  3.7     03-26  Mg     1.6     03-26    TPro  6.5  /  Alb  3.0<L>  /  TBili  0.4  /  DBili  x   /  AST  16  /  ALT  33  /  AlkPhos  77  03-26        CAPILLARY BLOOD GLUCOSE          RADIOLOGY & ADDITIONAL TESTS: Reviewed. OVERNIGHT EVENTS: Blad scan 888, could not straight cath --> cali placed. Agitated, not redirectable, required haldol & ativan, put on vest as patient broke through wrist-restraints.    SUBJECTIVE / INTERVAL HPI: Patient seen and examined at bedside. Unable to assess ROS as pt asleep after receiving haldol/ativan.     VITAL SIGNS:  Vital Signs Last 24 Hrs  T(C): 36.4 (26 Mar 2023 11:03), Max: 36.4 (26 Mar 2023 01:20)  T(F): 97.5 (26 Mar 2023 11:03), Max: 97.5 (26 Mar 2023 01:20)  HR: 54 (26 Mar 2023 12:00) (45 - 70)  BP: 133/85 (26 Mar 2023 12:00) (118/73 - 145/89)  BP(mean): 104 (26 Mar 2023 12:00) (93 - 115)  RR: 18 (26 Mar 2023 11:15) (13 - 26)  SpO2: 99% (26 Mar 2023 12:00) (95% - 100%)    Parameters below as of 26 Mar 2023 11:00  Patient On (Oxygen Delivery Method): room air        PHYSICAL EXAM: Exam limited due to agitation    General: NAD, lying in bed  HEENT: NCAT, trachea midline  Cardiovascular: S1, S2 normal; RRR, no M/G/R  Respiratory: CTABL; no W/R/R  Gastrointestinal: soft, nontender, nondistended. bowel sounds present.  Skin: no ulcerations or visible rashes appreciated  Extremities: WWP; no edema, clubbing or cyanosis  Vascular: 2+ radial, DP/PT pulses B/L  Neurological: Unable to assess orientation due to somnolence.     MEDICATIONS:  MEDICATIONS  (STANDING):  cefTRIAXone   IVPB 2000 milliGRAM(s) IV Intermittent every 24 hours  chlorhexidine 4% Liquid 1 Application(s) Topical <User Schedule>  dexMEDEtomidine Infusion 0.05 MICROgram(s)/kG/Hr (0.88 mL/Hr) IV Continuous <Continuous>  enoxaparin Injectable 40 milliGRAM(s) SubCutaneous every 24 hours    MEDICATIONS  (PRN):  haloperidol    Injectable 5 milliGRAM(s) IV Push every 4 hours PRN agitation  LORazepam   Injectable 2 milliGRAM(s) IV Push every 4 hours PRN Agitation      ALLERGIES:  Allergies    Allergy Status Unknown    Intolerances        LABS:                        13.3   9.55  )-----------( 301      ( 26 Mar 2023 04:52 )             41.1     03-26    144  |  108  |  7   ----------------------------<  104<H>  3.5   |  26  |  0.68    Ca    9.0      26 Mar 2023 04:52  Phos  3.7     03-26  Mg     1.6     03-26    TPro  6.5  /  Alb  3.0<L>  /  TBili  0.4  /  DBili  x   /  AST  16  /  ALT  33  /  AlkPhos  77  03-26        CAPILLARY BLOOD GLUCOSE          RADIOLOGY & ADDITIONAL TESTS: Reviewed.

## 2023-03-26 NOTE — BH CONSULTATION LIAISON PROGRESS NOTE - NSBHFUPINTERVALHXFT_PSY_A_CORE
Chart and nursing notes reviewed.  No acute events overnight although patient was noted to be somnolent.  Per NP Event Note written on 03/25/23 by Mir Plata NP, the patient presented with increased agitation this morning and was attempting to remove wrist restraints and the patient was able to break through his left wrist restraint. During this episode the patient was cursing at staff, swing arms attempting to hit staff members and attempting to bite staff. Patient pulled out right PIV, cardiac monitor and SPO2 monitoring. Code gray called and security arrived at bedside. Ativan 2mg IVP given x 1 w appropriate effect, dexmedetomidine GTT infusing and new USPIV inserted.     On evaluation,    Chart and nursing notes reviewed.  No acute events overnight although patient was noted to be somnolent.  Per NP Event Note written on 03/25/23 by Mir Plata NP, the patient presented with increased agitation this morning and was attempting to remove wrist restraints and the patient was able to break through his left wrist restraint. During this episode the patient was cursing at staff, swing arms attempting to hit staff members and attempting to bite staff. Patient pulled out right PIV, cardiac monitor and SPO2 monitoring. Code gray called and security arrived at bedside. Ativan 2mg IVP given x 1 w appropriate effect, dexmedetomidine GTT infusing and new USPIV inserted.     On evaluation this morning, the patient remains sedated following lorazepam IV PRN from earlier this morning.  The patient arouses to verbal stimulus but is unable to engage meaningfully in the interview.  The patient's nurse reports that he has been calm and cooperative this morning and has been sedated and sleeping since administration of PRNs.  She states that she was told that the patient's name is Huseyin.  The patient cooperates with the nurse for administration of Lovenox while this writer is present.  All questions and concerns addressed.     Chart and nursing notes reviewed.  No acute events overnight although patient was noted to be somnolent. C/L Team came into evaluate the patient for deliruim management.    The patient was alert and engaging in the interview. On evaluation the patient stated that his name was Rehan Back was born 1985.  Patient was unable to provided orientation to place, time, and situation. While evaluating the patient endorsed that he is a regularly user of K2 and crack cocaine. He states to support his drug use "I have to panhandle". Patient declined receiving any outpatient psychiatric help or any psychiatric inpatient admission. pt states that he "sorta remembers" what happens on the day he was admitted. He stated that the drugs "tasted funny". When asked for collateral he sated his father was named Cuate Back [phone]. The writer called the collateral, who stated that he does have a son named Rehan, who was recently released from senior care who is struggling with K2 and Crack addiction.     Pt states that he does not have any SI/HI, and at the time of assessment did not appear to be responding to any internal stimuli.

## 2023-03-26 NOTE — BH CONSULTATION LIAISON PROGRESS NOTE - CURRENT MEDICATION
MEDICATIONS  (STANDING):  cefTRIAXone   IVPB 2000 milliGRAM(s) IV Intermittent every 24 hours  chlorhexidine 4% Liquid 1 Application(s) Topical <User Schedule>  dexMEDEtomidine Infusion 0.05 MICROgram(s)/kG/Hr (0.88 mL/Hr) IV Continuous <Continuous>  enoxaparin Injectable 40 milliGRAM(s) SubCutaneous every 24 hours  potassium chloride  10 mEq/100 mL IVPB 10 milliEquivalent(s) IV Intermittent every 1 hour    MEDICATIONS  (PRN):  haloperidol    Injectable 5 milliGRAM(s) IV Push every 4 hours PRN agitation  LORazepam   Injectable 2 milliGRAM(s) IV Push every 4 hours PRN Agitation

## 2023-03-26 NOTE — BH CONSULTATION LIAISON PROGRESS NOTE - NSBHCONSULTMEDAGITATION_PSY_A_CORE FT
Can give Haldol 5 mg IM/IV/PO q6hr prn acute agitation not responsive to verbal redirection; hold for over-sedation  --Monitor EKG to assess QTc; hold antipsychotics if QTc >500ms

## 2023-03-27 DIAGNOSIS — D64.9 ANEMIA, UNSPECIFIED: ICD-10-CM

## 2023-03-27 DIAGNOSIS — F14.10 COCAINE ABUSE, UNCOMPLICATED: ICD-10-CM

## 2023-03-27 DIAGNOSIS — Z29.9 ENCOUNTER FOR PROPHYLACTIC MEASURES, UNSPECIFIED: ICD-10-CM

## 2023-03-27 LAB
ALBUMIN SERPL ELPH-MCNC: 3.1 G/DL — LOW (ref 3.3–5)
ALP SERPL-CCNC: 70 U/L — SIGNIFICANT CHANGE UP (ref 40–120)
ALT FLD-CCNC: 23 U/L — SIGNIFICANT CHANGE UP (ref 10–45)
ANION GAP SERPL CALC-SCNC: 10 MMOL/L — SIGNIFICANT CHANGE UP (ref 5–17)
AST SERPL-CCNC: 16 U/L — SIGNIFICANT CHANGE UP (ref 10–40)
BASOPHILS # BLD AUTO: 0.01 K/UL — SIGNIFICANT CHANGE UP (ref 0–0.2)
BASOPHILS NFR BLD AUTO: 0.2 % — SIGNIFICANT CHANGE UP (ref 0–2)
BILIRUB SERPL-MCNC: 0.3 MG/DL — SIGNIFICANT CHANGE UP (ref 0.2–1.2)
BLD GP AB SCN SERPL QL: NEGATIVE — SIGNIFICANT CHANGE UP
BUN SERPL-MCNC: 11 MG/DL — SIGNIFICANT CHANGE UP (ref 7–23)
CALCIUM SERPL-MCNC: 8.5 MG/DL — SIGNIFICANT CHANGE UP (ref 8.4–10.5)
CHLORIDE SERPL-SCNC: 104 MMOL/L — SIGNIFICANT CHANGE UP (ref 96–108)
CO2 SERPL-SCNC: 26 MMOL/L — SIGNIFICANT CHANGE UP (ref 22–31)
CREAT SERPL-MCNC: 0.78 MG/DL — SIGNIFICANT CHANGE UP (ref 0.5–1.3)
EGFR: 117 ML/MIN/1.73M2 — SIGNIFICANT CHANGE UP
EOSINOPHIL # BLD AUTO: 0.05 K/UL — SIGNIFICANT CHANGE UP (ref 0–0.5)
EOSINOPHIL NFR BLD AUTO: 0.8 % — SIGNIFICANT CHANGE UP (ref 0–6)
GLUCOSE SERPL-MCNC: 111 MG/DL — HIGH (ref 70–99)
HCT VFR BLD CALC: 35.1 % — LOW (ref 39–50)
HGB BLD-MCNC: 11.5 G/DL — LOW (ref 13–17)
IMM GRANULOCYTES NFR BLD AUTO: 0.3 % — SIGNIFICANT CHANGE UP (ref 0–0.9)
LYMPHOCYTES # BLD AUTO: 1.82 K/UL — SIGNIFICANT CHANGE UP (ref 1–3.3)
LYMPHOCYTES # BLD AUTO: 29.4 % — SIGNIFICANT CHANGE UP (ref 13–44)
MAGNESIUM SERPL-MCNC: 1.7 MG/DL — SIGNIFICANT CHANGE UP (ref 1.6–2.6)
MCHC RBC-ENTMCNC: 31.4 PG — SIGNIFICANT CHANGE UP (ref 27–34)
MCHC RBC-ENTMCNC: 32.8 GM/DL — SIGNIFICANT CHANGE UP (ref 32–36)
MCV RBC AUTO: 95.9 FL — SIGNIFICANT CHANGE UP (ref 80–100)
MONOCYTES # BLD AUTO: 0.42 K/UL — SIGNIFICANT CHANGE UP (ref 0–0.9)
MONOCYTES NFR BLD AUTO: 6.8 % — SIGNIFICANT CHANGE UP (ref 2–14)
NEUTROPHILS # BLD AUTO: 3.87 K/UL — SIGNIFICANT CHANGE UP (ref 1.8–7.4)
NEUTROPHILS NFR BLD AUTO: 62.5 % — SIGNIFICANT CHANGE UP (ref 43–77)
NRBC # BLD: 0 /100 WBCS — SIGNIFICANT CHANGE UP (ref 0–0)
PHOSPHATE SERPL-MCNC: 4.2 MG/DL — SIGNIFICANT CHANGE UP (ref 2.5–4.5)
PLATELET # BLD AUTO: 283 K/UL — SIGNIFICANT CHANGE UP (ref 150–400)
POTASSIUM SERPL-MCNC: 3.2 MMOL/L — LOW (ref 3.5–5.3)
POTASSIUM SERPL-SCNC: 3.2 MMOL/L — LOW (ref 3.5–5.3)
PROT SERPL-MCNC: 6 G/DL — SIGNIFICANT CHANGE UP (ref 6–8.3)
RBC # BLD: 3.66 M/UL — LOW (ref 4.2–5.8)
RBC # FLD: 13.4 % — SIGNIFICANT CHANGE UP (ref 10.3–14.5)
RH IG SCN BLD-IMP: POSITIVE — SIGNIFICANT CHANGE UP
SODIUM SERPL-SCNC: 140 MMOL/L — SIGNIFICANT CHANGE UP (ref 135–145)
WBC # BLD: 6.19 K/UL — SIGNIFICANT CHANGE UP (ref 3.8–10.5)
WBC # FLD AUTO: 6.19 K/UL — SIGNIFICANT CHANGE UP (ref 3.8–10.5)

## 2023-03-27 PROCEDURE — 99222 1ST HOSP IP/OBS MODERATE 55: CPT

## 2023-03-27 PROCEDURE — 99232 SBSQ HOSP IP/OBS MODERATE 35: CPT | Mod: GC

## 2023-03-27 PROCEDURE — 99233 SBSQ HOSP IP/OBS HIGH 50: CPT | Mod: GC

## 2023-03-27 RX ORDER — POTASSIUM CHLORIDE 20 MEQ
40 PACKET (EA) ORAL ONCE
Refills: 0 | Status: COMPLETED | OUTPATIENT
Start: 2023-03-27 | End: 2023-03-27

## 2023-03-27 RX ORDER — MAGNESIUM SULFATE 500 MG/ML
2 VIAL (ML) INJECTION ONCE
Refills: 0 | Status: COMPLETED | OUTPATIENT
Start: 2023-03-27 | End: 2023-03-27

## 2023-03-27 RX ORDER — POTASSIUM CHLORIDE 20 MEQ
10 PACKET (EA) ORAL
Refills: 0 | Status: DISCONTINUED | OUTPATIENT
Start: 2023-03-27 | End: 2023-03-27

## 2023-03-27 RX ADMIN — Medication 40 MILLIEQUIVALENT(S): at 14:23

## 2023-03-27 RX ADMIN — DEXMEDETOMIDINE HYDROCHLORIDE IN 0.9% SODIUM CHLORIDE 0.88 MICROGRAM(S)/KG/HR: 4 INJECTION INTRAVENOUS at 04:00

## 2023-03-27 RX ADMIN — Medication 25 GRAM(S): at 12:15

## 2023-03-27 RX ADMIN — Medication 40 MILLIEQUIVALENT(S): at 16:11

## 2023-03-27 RX ADMIN — CEFTRIAXONE 100 MILLIGRAM(S): 500 INJECTION, POWDER, FOR SOLUTION INTRAMUSCULAR; INTRAVENOUS at 06:01

## 2023-03-27 RX ADMIN — Medication 100 MILLIEQUIVALENT(S): at 14:23

## 2023-03-27 RX ADMIN — ENOXAPARIN SODIUM 40 MILLIGRAM(S): 100 INJECTION SUBCUTANEOUS at 08:36

## 2023-03-27 NOTE — PROGRESS NOTE ADULT - SUBJECTIVE AND OBJECTIVE BOX
**TRANSFER FROM MICU TO [facility]**  HOSPITAL COURSE:    Pt is a male of unknown age (approx 38) & unknown PMHx, who was BIBEMS for AMS after being found down on a bus, given Narcan x3 & intubated for airway protection, admitted to MICU for AHRF & AMS likely 2/2 drug use. On admission, UTox +THC & cocaine, though pt was found w/ white powder on him (possibly fentanyl). On admission, lactate elevated 2.7 (cleared). CT Head unrevealing, vEEG w/o evidence of seizure, TSH wnl. Pt initially hypotensive requiring pressors, & w/ elevated Cr --> now weaned off pressors & Cr resolved w/ fluids. ET tube w/ gram+ cocci, started on CTX 2g for possible asp PNA. Extubated on 3/24 to NC, weaned to RA. Course c/b episodes of agitation & aggression following extubation, requiring haldol & ativan several times for agitation. Psych consulted to assess for capacity.   On 3/27, patient able to communicate calmly with team: Endorsed hx of crack-cocaine use, does not remember events before admission, & stated he lives in a shelter but would like to move to a different one.   Pending: regular EKGs for QTc monitoring, Shelter placement  Consults: Psych       (24 Mar 2023 14:24)      INTERVAL HPI/OVERNIGHT EVENTS: As per overnight team ___. Patient seen and examinated at bedside.     ICU Vital Signs Last 24 Hrs  T(C): 36.3 (27 Mar 2023 10:56), Max: 37.1 (26 Mar 2023 22:22)  T(F): 97.4 (27 Mar 2023 10:56), Max: 98.8 (26 Mar 2023 22:22)  HR: 78 (27 Mar 2023 13:00) (56 - 78)  BP: 100/55 (27 Mar 2023 13:00) (83/52 - 127/78)  BP(mean): 73 (27 Mar 2023 13:00) (62 - 99)  ABP: --  ABP(mean): --  RR: 20 (27 Mar 2023 13:00) (17 - 24)  SpO2: 96% (27 Mar 2023 13:00) (96% - 98%)    O2 Parameters below as of 27 Mar 2023 13:00  Patient On (Oxygen Delivery Method): room air          I&O's Summary    26 Mar 2023 07:01  -  27 Mar 2023 07:00  --------------------------------------------------------  IN: 1403.7 mL / OUT: 1240 mL / NET: 163.7 mL    27 Mar 2023 07:01  -  27 Mar 2023 14:08  --------------------------------------------------------  IN: 828 mL / OUT: 90 mL / NET: 738 mL          LABS:                        11.5   6.19  )-----------( 283      ( 27 Mar 2023 05:30 )             35.1     03-27    140  |  104  |  11  ----------------------------<  111<H>  3.2<L>   |  26  |  0.78    Ca    8.5      27 Mar 2023 05:30  Phos  4.2     03-27  Mg     1.7     03-27    TPro  6.0  /  Alb  3.1<L>  /  TBili  0.3  /  DBili  x   /  AST  16  /  ALT  23  /  AlkPhos  70  03-27        CAPILLARY BLOOD GLUCOSE            RADIOLOGY & ADDITIONAL TESTS:    Consultant(s) Notes Reviewed:  [x ] YES  [ ] NO    MEDICATIONS  (STANDING):  cefTRIAXone   IVPB 2000 milliGRAM(s) IV Intermittent every 24 hours  chlorhexidine 4% Liquid 1 Application(s) Topical <User Schedule>  enoxaparin Injectable 40 milliGRAM(s) SubCutaneous every 24 hours  potassium chloride   Powder 40 milliEquivalent(s) Oral once  potassium chloride  10 mEq/100 mL IVPB 10 milliEquivalent(s) IV Intermittent every 1 hour    MEDICATIONS  (PRN):      PHYSICAL EXAM  General: NAD, sitting comfortably in bed  HEENT: NCAT, trachea midline  Cardiovascular: S1, S2 normal; RRR, no M/G/R  Respiratory: CTABL; no W/R/R, on RA  Gastrointestinal: soft, nontender, nondistended. bowel sounds present.  Extremities: WWP; no edema, clubbing or cyanosis  Vascular: 2+ DP pulses  Neurological: Fatigued but conversive  **TRANSFER FROM MICU TO [facility]**  HOSPITAL COURSE:    Pt is a male of unknown age (approx 38) & unknown PMHx, who was BIBEMS for AMS after being found down on a bus, given Narcan x3 & intubated for airway protection, admitted to MICU for AHRF & AMS likely 2/2 drug use. On admission, UTox +THC & cocaine, though pt was found w/ white powder on him (possibly fentanyl). On admission, lactate elevated 2.7 (cleared). CT Head unrevealing, vEEG w/o evidence of seizure, TSH wnl. Pt initially hypotensive requiring pressors, & w/ elevated Cr --> now weaned off pressors & Cr resolved w/ fluids. ET tube w/ gram+ cocci, started on CTX 2g for possible asp PNA. Extubated on 3/24 to NC, weaned to RA. Course c/b episodes of agitation & aggression following extubation, requiring haldol & ativan several times for agitation. Psych consulted to assess for capacity.   On 3/27, patient able to communicate calmly with team: Endorsed hx of crack-cocaine use, does not remember events before admission, & stated he lives in a shelter but would like to move to a different one.   Pending: regular EKGs for QTc monitoring, Shelter placement  Consults: Psych       (24 Mar 2023 14:24)      INTERVAL HPI/OVERNIGHT EVENTS: As per overnight team ___. Patient seen and examined at bedside.     ICU Vital Signs Last 24 Hrs  T(C): 36.3 (27 Mar 2023 10:56), Max: 37.1 (26 Mar 2023 22:22)  T(F): 97.4 (27 Mar 2023 10:56), Max: 98.8 (26 Mar 2023 22:22)  HR: 78 (27 Mar 2023 13:00) (56 - 78)  BP: 100/55 (27 Mar 2023 13:00) (83/52 - 127/78)  BP(mean): 73 (27 Mar 2023 13:00) (62 - 99)  ABP: --  ABP(mean): --  RR: 20 (27 Mar 2023 13:00) (17 - 24)  SpO2: 96% (27 Mar 2023 13:00) (96% - 98%)    O2 Parameters below as of 27 Mar 2023 13:00  Patient On (Oxygen Delivery Method): room air          I&O's Summary    26 Mar 2023 07:01  -  27 Mar 2023 07:00  --------------------------------------------------------  IN: 1403.7 mL / OUT: 1240 mL / NET: 163.7 mL    27 Mar 2023 07:01  -  27 Mar 2023 14:08  --------------------------------------------------------  IN: 828 mL / OUT: 90 mL / NET: 738 mL          LABS:                        11.5   6.19  )-----------( 283      ( 27 Mar 2023 05:30 )             35.1         140  |  104  |  11  ----------------------------<  111<H>  3.2<L>   |  26  |  0.78    Ca    8.5      27 Mar 2023 05:30  Phos  4.2       Mg     1.7         TPro  6.0  /  Alb  3.1<L>  /  TBili  0.3  /  DBili  x   /  AST  16  /  ALT  23  /  AlkPhos  70          CAPILLARY BLOOD GLUCOSE            RADIOLOGY & ADDITIONAL TESTS:    Consultant(s) Notes Reviewed:  [x ] YES  [ ] NO    MEDICATIONS  (STANDING):  cefTRIAXone   IVPB 2000 milliGRAM(s) IV Intermittent every 24 hours  chlorhexidine 4% Liquid 1 Application(s) Topical <User Schedule>  enoxaparin Injectable 40 milliGRAM(s) SubCutaneous every 24 hours  potassium chloride   Powder 40 milliEquivalent(s) Oral once  potassium chloride  10 mEq/100 mL IVPB 10 milliEquivalent(s) IV Intermittent every 1 hour    MEDICATIONS  (PRN):      PHYSICAL EXAM  General: NAD, sitting comfortably in bed  HEENT: NCAT, trachea midline  Cardiovascular: S1, S2 normal; RRR, no M/G/R  Respiratory: CTABL; no W/R/R, on RA  Gastrointestinal: soft, nontender, nondistended. bowel sounds present.  Extremities: WWP; no edema, clubbing or cyanosis  Vascular: 2+ DP pulses  Neurological: Fatigued but conversive. AOx1-2 (states name as Hernán Jones,  89, w/o understanding of hospital course of circumstances of admission)

## 2023-03-27 NOTE — PROGRESS NOTE ADULT - ASSESSMENT
Patient is Male w/ no PMHx, unknown age, approximately 40 years old per [facility] ED, who presents for altered mental status, intubated at [facility] for airway protection, admitted for altered mental status due to drugs (?fentanyl) vs seizure vs trauma vs infectious etiology.

## 2023-03-27 NOTE — BH CONSULTATION LIAISON PROGRESS NOTE - NSBHATTESTTYPEVISIT_PSY_A_CORE
Attending evaluating patient with [employer] (91189/83914 code)
Resident/Fellow with telephonic supervision

## 2023-03-27 NOTE — PROGRESS NOTE ADULT - ASSESSMENT
Patient is Male w/ no PMHx, unknown age, approximately 40 years old per [facility] ED, who presents for altered mental status, intubated at [facility] for airway protection, admitted for altered mental status due to drugs (?fentanyl) vs seizure vs trauma vs infectious etiology.    Neuro    #Altered mental status - RESOLVING  Differential includes seizures vs trauma vs drug-induced encephalopathy. Patient found on bus with altered mental status. Received Narcan x3. UTox + for THC and cocaine. Per ED signout- also, found w/ white powder on him. Lactate elevated to 2.7. vEEG w/o evidence of seizure. CT Head unrevealing. CPK elevated 468, now decreased. Lactate cleared 0.6. TSH elevated, normal T4.   Extubated 3/24, though combative and agitated. Increased sedation due to agitation  - continue to monitor  - As per psych can give Haldol 5mg IM/IV/PO q6hr prn acute agitation not responsive to verbal redirection; hold for over-sedation  - daily EKGs  - Unable to assess capacity while in delirious state  - f/u Psych recs      Pulmonary    #Intubated - RESOLVED  Intubated for airway protection and respiratory failure at [facility]. Extubated 3/24 AM.     #Acute hypercarbic respiratory failure - RESOLVED  ABG w/ pH 7.18, pCO2 74 on admission. Requiring intubation for altered mental status and respiratory failure at [facility]. Differential includes drug intoxication causing respiratory depression causing respiratory acidosis.   - extubated on 3/24, currently on RA    Cardiovascular - AYLIN    Renal - AYLIN      Heme    #Anemia  Hemoglobin 12.1. No signs of bleeding on exam.  - continue to trend hgb  - active type and screen  - transfuse for hgb <7    ID    #R/o aspiration pneumonia  Patient w/ altered mental status, intubated for airway protection. New leukocytosis w/ WBC elevated to 13.9, febrile to 101 degrees F. ET tube culture +for strep pneumo  - trend wbc  - C/w CTX 2g qd  - f/u blood cultures (ngtd)  - f/u urine culture (ngtd)    Preventive measures  F: None  E: replete as necessary  D: Regular  DVT: Lovenox 40

## 2023-03-27 NOTE — BH CONSULTATION LIAISON PROGRESS NOTE - CURRENT MEDICATION
MEDICATIONS  (STANDING):  cefTRIAXone   IVPB 2000 milliGRAM(s) IV Intermittent every 24 hours  chlorhexidine 4% Liquid 1 Application(s) Topical <User Schedule>  dexMEDEtomidine Infusion 0.05 MICROgram(s)/kG/Hr (0.88 mL/Hr) IV Continuous <Continuous>  enoxaparin Injectable 40 milliGRAM(s) SubCutaneous every 24 hours    MEDICATIONS  (PRN):  haloperidol    Injectable 5 milliGRAM(s) IV Push every 4 hours PRN agitation  LORazepam   Injectable 2 milliGRAM(s) IV Push every 4 hours PRN Agitation

## 2023-03-27 NOTE — PROGRESS NOTE ADULT - PROBLEM SELECTOR PLAN 1
P/t s/p extubation (extubated 3/24), found sedated on bus with white powder around his mouth, received Narcan x3 on field. UTox + for THC and cocaine. vEEG w/o evidence of seizure. CT Head unrevealing.  Pt admits to smoking crack cocaine (gets from street) and taking "6 pink pills" before episode occurs. Pt reports smoking K2 on and off. Pt reports previously living in a shelter but [place] trains and [place] street.   - As per psych can give Haldol 5mg IM/IV/PO q6hr prn acute agitation not responsive to verbal redirection; hold for over-sedation  - f/u Psych recs  -shelter packet

## 2023-03-27 NOTE — BH CONSULTATION LIAISON PROGRESS NOTE - NSBHATTESTBILLING_PSY_A_CORE
24905-Iiucfijgmv OBS or IP - moderate complexity OR 35-49 mins
56781-Luegpjahye OBS or IP - low complexity OR 25-34 mins

## 2023-03-27 NOTE — PROGRESS NOTE ADULT - SUBJECTIVE AND OBJECTIVE BOX
OVERNIGHT EVENTS:    SUBJECTIVE / INTERVAL HPI: Patient seen and examined at bedside.     VITAL SIGNS:  Vital Signs Last 24 Hrs  T(C): 36.3 (27 Mar 2023 10:56), Max: 37.1 (26 Mar 2023 22:22)  T(F): 97.4 (27 Mar 2023 10:56), Max: 98.8 (26 Mar 2023 22:22)  HR: 72 (27 Mar 2023 12:05) (56 - 77)  BP: 94/51 (27 Mar 2023 12:05) (83/52 - 141/84)  BP(mean): 69 (27 Mar 2023 12:05) (62 - 107)  RR: 20 (27 Mar 2023 12:05) (17 - 24)  SpO2: 97% (27 Mar 2023 12:05) (96% - 99%)    Parameters below as of 27 Mar 2023 12:05  Patient On (Oxygen Delivery Method): room air        PHYSICAL EXAM:    General: WDWN  HEENT: NCAT; PERRL, anicteric sclera; MMM  Neck: supple, trachea midline  Cardiovascular: S1, S2 normal; RRR, no M/G/R  Respiratory: CTABL; no W/R/R  Gastrointestinal: soft, nontender, nondistended. bowel sounds present.  Skin: no ulcerations or visible rashes appreciated  Extremities: WWP; no edema, clubbing or cyanosis  Vascular: 2+ radial, DP/PT pulses B/L  Neurological: AAOx3; CN II-XII grossly intact; no focal deficits    MEDICATIONS:  MEDICATIONS  (STANDING):  cefTRIAXone   IVPB 2000 milliGRAM(s) IV Intermittent every 24 hours  chlorhexidine 4% Liquid 1 Application(s) Topical <User Schedule>  dexMEDEtomidine Infusion 0.05 MICROgram(s)/kG/Hr (0.88 mL/Hr) IV Continuous <Continuous>  enoxaparin Injectable 40 milliGRAM(s) SubCutaneous every 24 hours  potassium chloride   Powder 40 milliEquivalent(s) Oral once  potassium chloride  10 mEq/100 mL IVPB 10 milliEquivalent(s) IV Intermittent every 1 hour    MEDICATIONS  (PRN):  haloperidol    Injectable 5 milliGRAM(s) IV Push every 4 hours PRN agitation  LORazepam   Injectable 2 milliGRAM(s) IV Push every 4 hours PRN Agitation      ALLERGIES:  Allergies    Allergy Status Unknown    Intolerances        LABS:                        11.5   6.19  )-----------( 283      ( 27 Mar 2023 05:30 )             35.1     03-27    140  |  104  |  11  ----------------------------<  111<H>  3.2<L>   |  26  |  0.78    Ca    8.5      27 Mar 2023 05:30  Phos  4.2     03-27  Mg     1.7     03-27    TPro  6.0  /  Alb  3.1<L>  /  TBili  0.3  /  DBili  x   /  AST  16  /  ALT  23  /  AlkPhos  70  03-27        CAPILLARY BLOOD GLUCOSE          RADIOLOGY & ADDITIONAL TESTS: Reviewed. OVERNIGHT EVENTS:  SCOTT ON    SUBJECTIVE / INTERVAL HPI: Patient seen and examined at bedside.     CONSTITUTIONAL: No weakness, fevers or chills  EYES/ENT: No visual changes;  No vertigo or throat pain   NECK: No pain or stiffness  RESPIRATORY: No cough, wheezing, hemoptysis; No shortness of breath  CARDIOVASCULAR: No chest pain or palpitations  GASTROINTESTINAL: No abdominal or epigastric pain. No nausea, vomiting, or hematemesis; No diarrhea or constipation. No melena or hematochezia.  GENITOURINARY: No dysuria, frequency or hematuria  NEUROLOGICAL: No numbness or weakness  SKIN: No itching, burning, rashes, or lesions   All other review of systems is negative unless indicated above.    VITAL SIGNS:  Vital Signs Last 24 Hrs  T(C): 36.3 (27 Mar 2023 10:56), Max: 37.1 (26 Mar 2023 22:22)  T(F): 97.4 (27 Mar 2023 10:56), Max: 98.8 (26 Mar 2023 22:22)  HR: 72 (27 Mar 2023 12:05) (56 - 77)  BP: 94/51 (27 Mar 2023 12:05) (83/52 - 141/84)  BP(mean): 69 (27 Mar 2023 12:05) (62 - 107)  RR: 20 (27 Mar 2023 12:05) (17 - 24)  SpO2: 97% (27 Mar 2023 12:05) (96% - 99%)    Parameters below as of 27 Mar 2023 12:05  Patient On (Oxygen Delivery Method): room air        PHYSICAL EXAM:    General: NAD, sitting up in bed, eating lunch, appears comfortable, talkative and cooperative  HEENT: NCAT; PERRL, anicteric sclera; MMM  Neck: supple, trachea midline  Cardiovascular: S1, S2 normal; RRR, no M/G/R  Respiratory: CTABL; no W/R/R  Gastrointestinal: soft, nontender, nondistended. bowel sounds present.  Skin: no ulcerations or visible rashes appreciated  Extremities: WWP; no edema, clubbing or cyanosis, SCDs in place BL LE  Vascular: 2+ radial, DP/PT pulses B/L  Neurological: AAOx3; CN II-XII grossly intact; no focal deficits    MEDICATIONS:  MEDICATIONS  (STANDING):  cefTRIAXone   IVPB 2000 milliGRAM(s) IV Intermittent every 24 hours  chlorhexidine 4% Liquid 1 Application(s) Topical <User Schedule>  dexMEDEtomidine Infusion 0.05 MICROgram(s)/kG/Hr (0.88 mL/Hr) IV Continuous <Continuous>  enoxaparin Injectable 40 milliGRAM(s) SubCutaneous every 24 hours  potassium chloride   Powder 40 milliEquivalent(s) Oral once  potassium chloride  10 mEq/100 mL IVPB 10 milliEquivalent(s) IV Intermittent every 1 hour    MEDICATIONS  (PRN):  haloperidol    Injectable 5 milliGRAM(s) IV Push every 4 hours PRN agitation  LORazepam   Injectable 2 milliGRAM(s) IV Push every 4 hours PRN Agitation      ALLERGIES:  Allergies    Allergy Status Unknown    Intolerances        LABS:                        11.5   6.19  )-----------( 283      ( 27 Mar 2023 05:30 )             35.1     03-27    140  |  104  |  11  ----------------------------<  111<H>  3.2<L>   |  26  |  0.78    Ca    8.5      27 Mar 2023 05:30  Phos  4.2     03-27  Mg     1.7     03-27    TPro  6.0  /  Alb  3.1<L>  /  TBili  0.3  /  DBili  x   /  AST  16  /  ALT  23  /  AlkPhos  70  03-27        CAPILLARY BLOOD GLUCOSE          RADIOLOGY & ADDITIONAL TESTS: Reviewed. **********************Transfer to [facility] from MICU***********************  Hospital Course:  Pt is a male of unknown age (approx 38) & unknown PMHx, who was BIBEMS for AMS after being found down on a bus, given Narcan x3 & intubated for airway protection, admitted to MICU for AHRF & AMS likely 2/2 drug use. On admission, UTox +THC & cocaine, though pt was found w/ white powder on him (possibly fentanyl). On admission, lactate elevated 2.7 (cleared). CT Head unrevealing, vEEG w/o evidence of seizure, TSH wnl. Pt initially hypotensive requiring pressors, & w/ elevated Cr --> now weaned off pressors & Cr resolved w/ fluids. ET tube w/ gram+ cocci, started on CTX 2g for possible asp PNA. Extubated on 3/24 to NC, weaned to RA. Course c/b episodes of agitation & aggression following extubation, requiring haldol & ativan several times for agitation. Psych consulted to assess for capacity.   On 3/27, patient able to communicate calmly with team: Endorsed hx of crack-cocaine use, does not remember events before admission, & stated he lives in a shelter but would like to move to a different one.     OVERNIGHT EVENTS:  SCOTT ON    SUBJECTIVE / INTERVAL HPI: Patient seen and examined at bedside.     CONSTITUTIONAL: No weakness, fevers or chills  EYES/ENT: No visual changes;  No vertigo or throat pain   NECK: No pain or stiffness  RESPIRATORY: No cough, wheezing, hemoptysis; No shortness of breath  CARDIOVASCULAR: No chest pain or palpitations  GASTROINTESTINAL: No abdominal or epigastric pain. No nausea, vomiting, or hematemesis; No diarrhea or constipation. No melena or hematochezia.  GENITOURINARY: No dysuria, frequency or hematuria  NEUROLOGICAL: No numbness or weakness  SKIN: No itching, burning, rashes, or lesions   All other review of systems is negative unless indicated above.    VITAL SIGNS:  Vital Signs Last 24 Hrs  T(C): 36.3 (27 Mar 2023 10:56), Max: 37.1 (26 Mar 2023 22:22)  T(F): 97.4 (27 Mar 2023 10:56), Max: 98.8 (26 Mar 2023 22:22)  HR: 72 (27 Mar 2023 12:05) (56 - 77)  BP: 94/51 (27 Mar 2023 12:05) (83/52 - 141/84)  BP(mean): 69 (27 Mar 2023 12:05) (62 - 107)  RR: 20 (27 Mar 2023 12:05) (17 - 24)  SpO2: 97% (27 Mar 2023 12:05) (96% - 99%)    Parameters below as of 27 Mar 2023 12:05  Patient On (Oxygen Delivery Method): room air        PHYSICAL EXAM:    General: NAD, sitting up in bed, eating lunch, appears comfortable, talkative and cooperative  HEENT: NCAT; PERRL, anicteric sclera; MMM  Neck: supple, trachea midline  Cardiovascular: S1, S2 normal; RRR, no M/G/R  Respiratory: CTABL; no W/R/R  Gastrointestinal: soft, nontender, nondistended. bowel sounds present.  Skin: no ulcerations or visible rashes appreciated  Extremities: WWP; no edema, clubbing or cyanosis, SCDs in place BL LE  Vascular: 2+ radial, DP/PT pulses B/L  Neurological: AAOx3; CN II-XII grossly intact; no focal deficits    MEDICATIONS:  MEDICATIONS  (STANDING):  cefTRIAXone   IVPB 2000 milliGRAM(s) IV Intermittent every 24 hours  chlorhexidine 4% Liquid 1 Application(s) Topical <User Schedule>  dexMEDEtomidine Infusion 0.05 MICROgram(s)/kG/Hr (0.88 mL/Hr) IV Continuous <Continuous>  enoxaparin Injectable 40 milliGRAM(s) SubCutaneous every 24 hours  potassium chloride   Powder 40 milliEquivalent(s) Oral once  potassium chloride  10 mEq/100 mL IVPB 10 milliEquivalent(s) IV Intermittent every 1 hour    MEDICATIONS  (PRN):  haloperidol    Injectable 5 milliGRAM(s) IV Push every 4 hours PRN agitation  LORazepam   Injectable 2 milliGRAM(s) IV Push every 4 hours PRN Agitation      ALLERGIES:  Allergies    Allergy Status Unknown    Intolerances        LABS:                        11.5   6.19  )-----------( 283      ( 27 Mar 2023 05:30 )             35.1     03-27    140  |  104  |  11  ----------------------------<  111<H>  3.2<L>   |  26  |  0.78    Ca    8.5      27 Mar 2023 05:30  Phos  4.2     03-27  Mg     1.7     03-27    TPro  6.0  /  Alb  3.1<L>  /  TBili  0.3  /  DBili  x   /  AST  16  /  ALT  23  /  AlkPhos  70  03-27        CAPILLARY BLOOD GLUCOSE          RADIOLOGY & ADDITIONAL TESTS: Reviewed.

## 2023-03-27 NOTE — PROGRESS NOTE ADULT - ATTENDING COMMENTS
Found down, time unknown, suspected drug overdose but unclear if intentional. C/b respiratory failure requiring mechanical ventilation. Doing well from pulmonary perspective after extubation. Persistent agitation and delirium controlled with haldol and ativan; will continue; check QTc daily. Blood/urine/sputum cultures negative to date; finish 7 day course of abx. Rest of plan as per above.
Found down, time unknown, suspected drug overdose but unclear if intentional. C/b respiratory failure requiring mechanical ventilation. Doing well from pulmonary perspective after extubation. Agitation and delirium controlled with haldol and ativan as needed; check QTc daily. Blood/urine/sputum cultures negative to date; finish 7 day course of abx. Appreciate psych recs. Rest of plan as per above. Floor eligible.
Found down, time unknown, suspected drug overdose but unclear if intentional. C/b respiratory failure requiring mechanical ventilation. Doing well from pulmonary perspective after extubation. Persistent agitation and delirium controlled with haldol and ativan; will continue; check QTc daily. Blood/urine/sputum cultures negative to date; finish 7 day course of abx. Appreciate psych recs. Rest of plan as per above.
Found down, time unknown, suspected drug overdose but unclear if intentional. C/b respiratory failure requiring mechanical ventilation. Significant secretions concerning for aspiration pneumonia. Blood/urine/sputum cultures negative to date; finish 7 day course. Has been extubated and is doing well. Still intermittently agitated and not able to provide full history; c/w ativan/haldol prn; QTC is within acceptable range. Rest of plan as per above.
37 year old male with an unknown PMHx who presented after being found down 2/2 drug overdose, initially admitted to the MICU for AMS and airway protection, now stable for transfer to [facility].        #Toxic Metabolic Encephalopathy   #Polysubstance Abuse    #Agitation  -UTox: THC and cocaine   -vEEG negative for seizures, CT-Head unremarkable, TSH/T4 unremarkable   -psych consulted, can utilize PRN Haldol and Ativan if patient is not verbally redirectable     #Aspiration Pneumonia    -continue with CTX 2g daily to complete 7-day course, can transition to PO Abx upon discharge   -BCx/UCX (3/23): NGTD, Sputum Cx: step pneumoniae        DVT PPx: Lovenox     Dispo: shelter, likely in next 24 hours

## 2023-03-27 NOTE — BH CONSULTATION LIAISON PROGRESS NOTE - NSBHCHARTREVIEWLAB_PSY_A_CORE FT
11.5   6.19  )-----------( 283      ( 27 Mar 2023 05:30 )             35.1     03-27    140  |  104  |  11  ----------------------------<  111<H>  3.2<L>   |  26  |  0.78    Ca    8.5      27 Mar 2023 05:30  Phos  4.2     03-27  Mg     1.7     03-27    TPro  6.0  /  Alb  3.1<L>  /  TBili  0.3  /  DBili  x   /  AST  16  /  ALT  23  /  AlkPhos  70  03-27

## 2023-03-27 NOTE — BH CONSULTATION LIAISON PROGRESS NOTE - NSBHATTESTCOMMENTATTENDFT_PSY_A_CORE
Pt evaluated separately this morning and d/w primary team. ~36yo man with unknown history who presents with improved mental status consistent with resolving delirium in setting of ICU admission after found altered on a bus covered in a white powder, admitted for management of AMS with acute respiratory failure. Pt now extubated and tolerating wean in sedation, today consistently providing a name "Hernán Back" and  "87".     Pt awake but drowsy on my evaluation, repeatedly falling asleep during interview, grossly organized in his thinking, oriented to name and  as listed, oriented to hospital and city but not particular location, disoriented to date and situation. Pt reports recall of being on a bus and smoking crack, does not recall any subsequent events. Denies any other substance use and does not think it is possible his crack was laced. Reports feeling fatigued with cravings to use crack; also reports intermittent VH of spiders that he at times recognizes are not real. No other psychotic sx reported. Denies any other psychiatric complaints or any past psych dx/treatment. Denies ever any SI or self-harm. Unable to provide any collateral contacts due to repeatedly falling asleep during assessment.    DDx includes multifactorial delirium and cocaine use disorder, r/o any other substance use, with suspected accidental use of additional substances such as fentanyl as part of precipitant to admission. Cocaine withdrawal likely contributing to mood sx with irritability previously observed. No indication of any suicidality. Unclear if any underlying psychiatric illness, pt denies and there does not appear to be any acutely decompensated depression/shayne/psychosis. Currently agreeable to remain in hospital but has notably been intermittently becoming agitated and requesting discharge, without insight into reason for admission or risks of leaving.     RECOMMENDATIONS: Given gross lack of understanding of reasons for admission, continues to lack capacity to leave AMA though agreeable to remain in hospital for now. Continue Haldol and Ativan PRN for acute agitation that is not verbally redirectable. Collateral as able to obtain. Delirium precautions. Will attempt to reassess. Do not anticipate a psychiatric barrier to discharge when medically ready at this time.

## 2023-03-27 NOTE — PROGRESS NOTE ADULT - PROBLEM SELECTOR PLAN 2
#Anemia  Hemoglobin 12.1. No signs of bleeding on exam.  - continue to trend hgb  - active type and screen  - transfuse for hgb <7

## 2023-03-27 NOTE — BH CONSULTATION LIAISON PROGRESS NOTE - NSBHFUPINTERVALHXFT_PSY_A_CORE
Pt evaluated separately this morning and d/w primary team. ~38yo man with unknown history who presents with improved mental status consistent with resolving delirium in setting of ICU admission after found altered on a bus covered in a white powder, admitted for management of AMS with acute respiratory failure. Pt now extubated and tolerating wean in sedation, today consistently providing a name "Hernán Back" and  "87".     Pt awake but drowsy on my evaluation, repeatedly falling asleep during interview, grossly organized in his thinking, oriented to name and  as listed, oriented to hospital and city but not particular location, disoriented to date and situation. Pt reports recall of being on a bus and smoking crack, does not recall any subsequent events. Denies any other substance use and does not think it is possible his crack was laced. Reports feeling fatigued with cravings to use crack; also reports intermittent VH of spiders that he at times recognizes are not real. No other psychotic sx reported. Denies any other psychiatric complaints or any past psych dx/treatment. Denies ever any SI or self-harm.

## 2023-03-27 NOTE — BH CONSULTATION LIAISON PROGRESS NOTE - NSBHCHARTREVIEWVS_PSY_A_CORE FT
Vital Signs Last 24 Hrs  T(C): 36.4 (26 Mar 2023 01:20), Max: 36.4 (26 Mar 2023 01:20)  T(F): 97.5 (26 Mar 2023 01:20), Max: 97.5 (26 Mar 2023 01:20)  HR: 56 (26 Mar 2023 09:00) (45 - 70)  BP: 128/88 (26 Mar 2023 09:00) (118/73 - 145/89)  BP(mean): 104 (26 Mar 2023 09:00) (93 - 115)  RR: 22 (26 Mar 2023 09:00) (13 - 26)  SpO2: 98% (26 Mar 2023 09:00) (95% - 100%)    Parameters below as of 26 Mar 2023 09:00  Patient On (Oxygen Delivery Method): room air    
Vital Signs Last 24 Hrs  T(C): 36.5 (27 Mar 2023 06:02), Max: 37.1 (26 Mar 2023 22:22)  T(F): 97.7 (27 Mar 2023 06:02), Max: 98.8 (26 Mar 2023 22:22)  HR: 68 (27 Mar 2023 10:00) (50 - 77)  BP: 92/62 (27 Mar 2023 10:00) (83/52 - 141/84)  BP(mean): 68 (27 Mar 2023 10:00) (62 - 107)  RR: 24 (27 Mar 2023 10:00) (17 - 24)  SpO2: 96% (27 Mar 2023 10:00) (96% - 100%)    Parameters below as of 27 Mar 2023 10:00  Patient On (Oxygen Delivery Method): room air

## 2023-03-27 NOTE — BH CONSULTATION LIAISON PROGRESS NOTE - NSBHATTESTAPPAMEND_PSY_A_CORE
I have personally seen and examined this patient. I fully participated in the care of this patient. I have made amendments to the documentation where appropriate and otherwise agree with the history, physical exam, and plan as documented by the IRMA

## 2023-03-27 NOTE — BH CONSULTATION LIAISON PROGRESS NOTE - NSBHASSESSMENTFT_PSY_ALL_CORE
Pt evaluated separately this morning and d/w primary team. ~36yo man with unknown history who presents with improved mental status consistent with resolving delirium in setting of ICU admission after found altered on a bus covered in a white powder, admitted for management of AMS with acute respiratory failure. Pt now extubated and tolerating wean in sedation, today consistently providing a name "Hernán Back" and  "87".     Pt awake but drowsy on my evaluation, repeatedly falling asleep during interview, grossly organized in his thinking, oriented to name and  as listed, oriented to hospital and city but not particular location, disoriented to date and situation. Pt reports recall of being on a bus and smoking crack, does not recall any subsequent events. Denies any other substance use and does not think it is possible his crack was laced. Reports feeling fatigued with cravings to use crack; also reports intermittent VH of spiders that he at times recognizes are not real. No other psychotic sx reported. Denies any other psychiatric complaints or any past psych dx/treatment. Denies ever any SI or self-harm.    Pt with acute and chronic RF for suicide including age, gender, substance abuse and perhaps others. These are mitigated in part by various PF including absence of SI, fear of pain, desire to live, and likely others. No evidence that pt is not risk of harm to self and others at this time and therefore no indication that pt requires involuntary psychiatric hospitalization.    RECOMMENDATION    RECOMMENDATIONS: Given gross lack of understanding of reasons for admission, continues to lack capacity to leave AMA though agreeable to remain in hospital for now. Continue Haldol and Ativan PRN for acute agitation that is not verbally redirectable. Collateral as able to obtain. Delirium precautions. Will attempt to reassess. Do not anticipate a psychiatric barrier to discharge when medically ready at this time.

## 2023-03-28 ENCOUNTER — TRANSCRIPTION ENCOUNTER (OUTPATIENT)
Age: 38
End: 2023-03-28

## 2023-03-28 VITALS
DIASTOLIC BLOOD PRESSURE: 66 MMHG | OXYGEN SATURATION: 100 % | SYSTOLIC BLOOD PRESSURE: 120 MMHG | RESPIRATION RATE: 17 BRPM | HEART RATE: 99 BPM | TEMPERATURE: 98 F

## 2023-03-28 LAB
ALBUMIN SERPL ELPH-MCNC: 3.9 G/DL — SIGNIFICANT CHANGE UP (ref 3.3–5)
ALP SERPL-CCNC: 91 U/L — SIGNIFICANT CHANGE UP (ref 40–120)
ALT FLD-CCNC: 37 U/L — SIGNIFICANT CHANGE UP (ref 10–45)
ANION GAP SERPL CALC-SCNC: 15 MMOL/L — SIGNIFICANT CHANGE UP (ref 5–17)
AST SERPL-CCNC: 37 U/L — SIGNIFICANT CHANGE UP (ref 10–40)
BASOPHILS # BLD AUTO: 0.02 K/UL — SIGNIFICANT CHANGE UP (ref 0–0.2)
BASOPHILS NFR BLD AUTO: 0.3 % — SIGNIFICANT CHANGE UP (ref 0–2)
BILIRUB SERPL-MCNC: 0.5 MG/DL — SIGNIFICANT CHANGE UP (ref 0.2–1.2)
BUN SERPL-MCNC: 7 MG/DL — SIGNIFICANT CHANGE UP (ref 7–23)
CALCIUM SERPL-MCNC: 9.9 MG/DL — SIGNIFICANT CHANGE UP (ref 8.4–10.5)
CHLORIDE SERPL-SCNC: 103 MMOL/L — SIGNIFICANT CHANGE UP (ref 96–108)
CO2 SERPL-SCNC: 22 MMOL/L — SIGNIFICANT CHANGE UP (ref 22–31)
CREAT SERPL-MCNC: 0.8 MG/DL — SIGNIFICANT CHANGE UP (ref 0.5–1.3)
CULTURE RESULTS: SIGNIFICANT CHANGE UP
CULTURE RESULTS: SIGNIFICANT CHANGE UP
EGFR: 116 ML/MIN/1.73M2 — SIGNIFICANT CHANGE UP
EOSINOPHIL # BLD AUTO: 0.02 K/UL — SIGNIFICANT CHANGE UP (ref 0–0.5)
EOSINOPHIL NFR BLD AUTO: 0.3 % — SIGNIFICANT CHANGE UP (ref 0–6)
GLUCOSE BLDC GLUCOMTR-MCNC: 111 MG/DL — HIGH (ref 70–99)
GLUCOSE BLDC GLUCOMTR-MCNC: 168 MG/DL — HIGH (ref 70–99)
GLUCOSE BLDC GLUCOMTR-MCNC: 193 MG/DL — HIGH (ref 70–99)
GLUCOSE SERPL-MCNC: 147 MG/DL — HIGH (ref 70–99)
HCT VFR BLD CALC: 42 % — SIGNIFICANT CHANGE UP (ref 39–50)
HGB BLD-MCNC: 13.6 G/DL — SIGNIFICANT CHANGE UP (ref 13–17)
IMM GRANULOCYTES NFR BLD AUTO: 0.7 % — SIGNIFICANT CHANGE UP (ref 0–0.9)
LYMPHOCYTES # BLD AUTO: 1.64 K/UL — SIGNIFICANT CHANGE UP (ref 1–3.3)
LYMPHOCYTES # BLD AUTO: 23.4 % — SIGNIFICANT CHANGE UP (ref 13–44)
MAGNESIUM SERPL-MCNC: 1.8 MG/DL — SIGNIFICANT CHANGE UP (ref 1.6–2.6)
MCHC RBC-ENTMCNC: 31.1 PG — SIGNIFICANT CHANGE UP (ref 27–34)
MCHC RBC-ENTMCNC: 32.4 GM/DL — SIGNIFICANT CHANGE UP (ref 32–36)
MCV RBC AUTO: 96.1 FL — SIGNIFICANT CHANGE UP (ref 80–100)
MONOCYTES # BLD AUTO: 0.51 K/UL — SIGNIFICANT CHANGE UP (ref 0–0.9)
MONOCYTES NFR BLD AUTO: 7.3 % — SIGNIFICANT CHANGE UP (ref 2–14)
NEUTROPHILS # BLD AUTO: 4.77 K/UL — SIGNIFICANT CHANGE UP (ref 1.8–7.4)
NEUTROPHILS NFR BLD AUTO: 68 % — SIGNIFICANT CHANGE UP (ref 43–77)
NRBC # BLD: 0 /100 WBCS — SIGNIFICANT CHANGE UP (ref 0–0)
PHOSPHATE SERPL-MCNC: 3.4 MG/DL — SIGNIFICANT CHANGE UP (ref 2.5–4.5)
PLATELET # BLD AUTO: 336 K/UL — SIGNIFICANT CHANGE UP (ref 150–400)
POTASSIUM SERPL-MCNC: 3.5 MMOL/L — SIGNIFICANT CHANGE UP (ref 3.5–5.3)
POTASSIUM SERPL-SCNC: 3.5 MMOL/L — SIGNIFICANT CHANGE UP (ref 3.5–5.3)
PROT SERPL-MCNC: 8.5 G/DL — HIGH (ref 6–8.3)
RBC # BLD: 4.37 M/UL — SIGNIFICANT CHANGE UP (ref 4.2–5.8)
RBC # FLD: 13.4 % — SIGNIFICANT CHANGE UP (ref 10.3–14.5)
SODIUM SERPL-SCNC: 140 MMOL/L — SIGNIFICANT CHANGE UP (ref 135–145)
SPECIMEN SOURCE: SIGNIFICANT CHANGE UP
SPECIMEN SOURCE: SIGNIFICANT CHANGE UP
WBC # BLD: 7.01 K/UL — SIGNIFICANT CHANGE UP (ref 3.8–10.5)
WBC # FLD AUTO: 7.01 K/UL — SIGNIFICANT CHANGE UP (ref 3.8–10.5)

## 2023-03-28 PROCEDURE — 82553 CREATINE MB FRACTION: CPT

## 2023-03-28 PROCEDURE — 71045 X-RAY EXAM CHEST 1 VIEW: CPT

## 2023-03-28 PROCEDURE — 85730 THROMBOPLASTIN TIME PARTIAL: CPT

## 2023-03-28 PROCEDURE — 81001 URINALYSIS AUTO W/SCOPE: CPT

## 2023-03-28 PROCEDURE — 93005 ELECTROCARDIOGRAM TRACING: CPT

## 2023-03-28 PROCEDURE — 80307 DRUG TEST PRSMV CHEM ANLYZR: CPT

## 2023-03-28 PROCEDURE — 95700 EEG CONT REC W/VID EEG TECH: CPT

## 2023-03-28 PROCEDURE — 87637 SARSCOV2&INF A&B&RSV AMP PRB: CPT

## 2023-03-28 PROCEDURE — 84484 ASSAY OF TROPONIN QUANT: CPT

## 2023-03-28 PROCEDURE — 82550 ASSAY OF CK (CPK): CPT

## 2023-03-28 PROCEDURE — 82803 BLOOD GASES ANY COMBINATION: CPT

## 2023-03-28 PROCEDURE — 80053 COMPREHEN METABOLIC PANEL: CPT

## 2023-03-28 PROCEDURE — 83605 ASSAY OF LACTIC ACID: CPT

## 2023-03-28 PROCEDURE — 86850 RBC ANTIBODY SCREEN: CPT

## 2023-03-28 PROCEDURE — 86901 BLOOD TYPING SEROLOGIC RH(D): CPT

## 2023-03-28 PROCEDURE — 70450 CT HEAD/BRAIN W/O DYE: CPT

## 2023-03-28 PROCEDURE — 86900 BLOOD TYPING SEROLOGIC ABO: CPT

## 2023-03-28 PROCEDURE — 31500 INSERT EMERGENCY AIRWAY: CPT

## 2023-03-28 PROCEDURE — 36415 COLL VENOUS BLD VENIPUNCTURE: CPT

## 2023-03-28 PROCEDURE — 94002 VENT MGMT INPAT INIT DAY: CPT

## 2023-03-28 PROCEDURE — 84100 ASSAY OF PHOSPHORUS: CPT

## 2023-03-28 PROCEDURE — 87070 CULTURE OTHR SPECIMN AEROBIC: CPT

## 2023-03-28 PROCEDURE — 84439 ASSAY OF FREE THYROXINE: CPT

## 2023-03-28 PROCEDURE — 95705 EEG W/O VID 2-12 HR UNMNTR: CPT

## 2023-03-28 PROCEDURE — 93010 ELECTROCARDIOGRAM REPORT: CPT

## 2023-03-28 PROCEDURE — 99239 HOSP IP/OBS DSCHRG MGMT >30: CPT | Mod: GC

## 2023-03-28 PROCEDURE — 96374 THER/PROPH/DIAG INJ IV PUSH: CPT

## 2023-03-28 PROCEDURE — 87040 BLOOD CULTURE FOR BACTERIA: CPT

## 2023-03-28 PROCEDURE — 83735 ASSAY OF MAGNESIUM: CPT

## 2023-03-28 PROCEDURE — 85027 COMPLETE CBC AUTOMATED: CPT

## 2023-03-28 PROCEDURE — 85025 COMPLETE CBC W/AUTO DIFF WBC: CPT

## 2023-03-28 PROCEDURE — 99291 CRITICAL CARE FIRST HOUR: CPT

## 2023-03-28 PROCEDURE — 82607 VITAMIN B-12: CPT

## 2023-03-28 PROCEDURE — 83690 ASSAY OF LIPASE: CPT

## 2023-03-28 PROCEDURE — 84443 ASSAY THYROID STIM HORMONE: CPT

## 2023-03-28 PROCEDURE — 82746 ASSAY OF FOLIC ACID SERUM: CPT

## 2023-03-28 PROCEDURE — 82962 GLUCOSE BLOOD TEST: CPT

## 2023-03-28 PROCEDURE — 96375 TX/PRO/DX INJ NEW DRUG ADDON: CPT | Mod: XU

## 2023-03-28 PROCEDURE — 87181 SC STD AGAR DILUTION PER AGT: CPT

## 2023-03-28 PROCEDURE — 87086 URINE CULTURE/COLONY COUNT: CPT

## 2023-03-28 PROCEDURE — 85610 PROTHROMBIN TIME: CPT

## 2023-03-28 PROCEDURE — 83036 HEMOGLOBIN GLYCOSYLATED A1C: CPT

## 2023-03-28 PROCEDURE — 87184 SC STD DISK METHOD PER PLATE: CPT

## 2023-03-28 RX ADMIN — ENOXAPARIN SODIUM 40 MILLIGRAM(S): 100 INJECTION SUBCUTANEOUS at 09:41

## 2023-03-28 RX ADMIN — CHLORHEXIDINE GLUCONATE 1 APPLICATION(S): 213 SOLUTION TOPICAL at 06:33

## 2023-03-28 NOTE — DISCHARGE NOTE NURSING/CASE MANAGEMENT/SOCIAL WORK - NSDCPEFALRISK_GEN_ALL_CORE
For information on Fall & Injury Prevention, visit: [email or web address] OR  [email or web address] OR  https://www.cdc.gov/steadi/patient.html

## 2023-03-28 NOTE — DISCHARGE NOTE PROVIDER - CARE PROVIDER_API CALL
[facility],   [address] [place] [address]  Phone: (   )    -  Fax: (   )    -  Follow Up Time:

## 2023-03-28 NOTE — PROGRESS NOTE ADULT - SUBJECTIVE AND OBJECTIVE BOX
OVERNIGHT EVENTS:    SUBJECTIVE / INTERVAL HPI: Patient seen and examined at bedside.     VITAL SIGNS:  Vital Signs Last 24 Hrs  T(C): 37.3 (28 Mar 2023 07:00), Max: 37.7 (27 Mar 2023 21:02)  T(F): 99.1 (28 Mar 2023 07:00), Max: 99.9 (27 Mar 2023 21:02)  HR: 72 (28 Mar 2023 07:00) (68 - 90)  BP: 117/71 (28 Mar 2023 07:00) (83/52 - 131/79)  BP(mean): 86 (28 Mar 2023 07:00) (62 - 86)  RR: 18 (28 Mar 2023 07:00) (17 - 24)  SpO2: 96% (28 Mar 2023 07:00) (96% - 99%)    Parameters below as of 28 Mar 2023 07:00  Patient On (Oxygen Delivery Method): room air        PHYSICAL EXAM:    General: WDWN  HEENT: NCAT; PERRL, anicteric sclera; MMM  Neck: supple, trachea midline  Cardiovascular: S1, S2 normal; RRR, no M/G/R  Respiratory: CTABL; no W/R/R  Gastrointestinal: soft, nontender, nondistended. bowel sounds present.  Skin: no ulcerations or visible rashes appreciated  Extremities: WWP; no edema, clubbing or cyanosis  Vascular: 2+ radial, DP/PT pulses B/L  Neurological: AAOx3; CN II-XII grossly intact; no focal deficits    MEDICATIONS:  MEDICATIONS  (STANDING):  cefTRIAXone   IVPB 2000 milliGRAM(s) IV Intermittent every 24 hours  chlorhexidine 4% Liquid 1 Application(s) Topical <User Schedule>  enoxaparin Injectable 40 milliGRAM(s) SubCutaneous every 24 hours    MEDICATIONS  (PRN):      ALLERGIES:  Allergies    Allergy Status Unknown    Intolerances        LABS:                        11.5   6.19  )-----------( 283      ( 27 Mar 2023 05:30 )             35.1     03-27    140  |  104  |  11  ----------------------------<  111<H>  3.2<L>   |  26  |  0.78    Ca    8.5      27 Mar 2023 05:30  Phos  4.2     03-27  Mg     1.7     03-27    TPro  6.0  /  Alb  3.1<L>  /  TBili  0.3  /  DBili  x   /  AST  16  /  ALT  23  /  AlkPhos  70  03-27        CAPILLARY BLOOD GLUCOSE      POCT Blood Glucose.: 193 mg/dL (28 Mar 2023 07:06)      RADIOLOGY & ADDITIONAL TESTS: Reviewed.

## 2023-03-28 NOTE — DISCHARGE NOTE PROVIDER - PROVIDER TOKENS
FREE:[LAST:[[facility]],PHONE:[(   )    -],FAX:[(   )    -],ADDRESS:[[address] [place] [address]]]

## 2023-03-28 NOTE — DISCHARGE NOTE PROVIDER - ATTENDING DISCHARGE PHYSICAL EXAMINATION:
-Gen: NAD, resting comfortably at side of bed  -HEENT: EOMI, PERRL, no scleral icterus, no JVD  -CV: normal S1 and S2, no murmurs appreciated   -Lungs: CTABL, normal respiratory effort on RA  -Ab: soft, NT, ND, normal BS  -Ext: no LE edema  -Neuro: A&O x 3, no focal deficits

## 2023-03-28 NOTE — DISCHARGE NOTE PROVIDER - HOSPITAL COURSE
#Discharge: do not delete    Patient is Male w/ no PMHx, unknown age, approximately 40 years old per [facility] ED, who presents for altered mental status, intubated at [facility] for airway protection, admitted for altered mental status due to drugs (?fentanyl) vs seizure vs trauma vs infectious etiology.    Hospital course (by problem):   Pt is a male of unknown age (approx 38) & unknown PMHx, who was BIBEMS for AMS after being found down on a bus, given Narcan x3 & intubated for airway protection, admitted to MICU for AHRF & AMS likely 2/2 drug use. On admission, UTox +THC & cocaine, though pt was found w/ white powder on him (possibly fentanyl). On admission, lactate elevated 2.7 (cleared). CT Head unrevealing, vEEG w/o evidence of seizure, TSH wnl. Pt initially hypotensive requiring pressors, & w/ elevated Cr --> now weaned off pressors & Cr resolved w/ fluids. ET tube w/ gram+ cocci, started on CTX 2g for possible asp PNA. Extubated on 3/24 to NC, weaned to RA. Course c/b episodes of agitation & aggression following extubation, requiring haldol & ativan several times for agitation. Psych consulted to assess for capacity.   On 3/27, patient able to communicate calmly with team: Endorsed hx of crack-cocaine use, does not remember events before admission, & stated he lives in a shelter but would like to move to a different one.     # Cocaine abuse.   -P/t s/p extubation (extubated 3/24), found sedated on bus with white powder around his mouth, received Narcan x3 on field. UTox + for THC and cocaine. vEEG w/o evidence of seizure. CT Head unrevealing.  Pt admits to smoking crack cocaine (gets from street) and taking "6 pink pills" before episode occurs. Pt reports smoking K2 on and off. Pt reports previously living in a shelter [place].   - As per psych can give Haldol 5mg IM/IV/PO q6hr prn acute agitation not responsive to verbal redirection; hold for over-sedation  - f/u Psych recs  -shelter packet    #Anemia   -Hemoglobin 12.1. No signs of bleeding on exam.  - continue to trend hgb  - active type and     #R/o Aspiration  -elevated WBC, CXR clear  -    Patient was discharged to: shelter    New medications: none  Changes to old medications:none  Medications that were stopped:none    Items to follow up as outpatient:    Physical exam at the time of discharge:  VITALS:     GENERAL: NAD, lying in bed comfortably  HEAD:  Atraumatic, Normocephalic  EYES: EOMI, PERRLA, conjunctiva and sclera clear  ENT: Moist mucous membranes  NECK: Supple, No JVD  CHEST/LUNG: Clear to auscultation bilaterally; No rales, rhonchi, wheezing, or rubs. Unlabored respirations  HEART: Regular rate and rhythm; No murmurs, rubs, or gallops  ABDOMEN: Bowel sounds present; Soft, Nontender, Nondistended. No hepatomegally  EXTREMITIES:  2+ Peripheral Pulses, brisk capillary refill. No clubbing, cyanosis, or edema  NERVOUS SYSTEM:  Alert & Oriented X3, speech clear. No deficits   MSK: FROM all 4 extremities, full and equal strength  SKIN: No rashes or lesions     #Discharge: do not delete    Patient is Male w/ no PMHx, unknown age, approximately 40 years old per [facility] ED, who presents for altered mental status, intubated at [facility] for airway protection, admitted for altered mental status due to drugs (?fentanyl) vs seizure vs trauma vs infectious etiology.    Hospital course (by problem):   Pt is a male of unknown age (approx 38) & unknown PMHx, who was BIBEMS for AMS after being found down on a bus, given Narcan x3 & intubated for airway protection, admitted to MICU for AHRF & AMS likely 2/2 drug use. On admission, UTox +THC & cocaine, though pt was found w/ white powder on him (possibly fentanyl). On admission, lactate elevated 2.7 (cleared). CT Head unrevealing, vEEG w/o evidence of seizure, TSH wnl. Pt initially hypotensive requiring pressors, & w/ elevated Cr --> now weaned off pressors & Cr resolved w/ fluids. ET tube w/ gram+ cocci, started on CTX 2g for possible asp PNA. Extubated on 3/24 to NC, weaned to RA. Course c/b episodes of agitation & aggression following extubation, requiring haldol & ativan several times for agitation. Psych consulted to assess for capacity.   On 3/27, patient able to communicate calmly with team: Endorsed hx of crack-cocaine use, does not remember events before admission, & stated he lives in a shelter but would like to move to a different one.     # Cocaine abuse.   -P/t s/p extubation (extubated 3/24), found sedated on bus with white powder around his mouth, received Narcan x3 on field. UTox + for THC and cocaine. vEEG w/o evidence of seizure. CT Head unrevealing.  Pt admits to smoking crack cocaine (gets from street) and taking "6 pink pills" before episode occurs. Pt reports smoking K2 on and off. Pt reports previously living in a shelter but [place] the trains and [place] the street.   - As per psych can give Haldol 5mg IM/IV/PO q6hr prn acute agitation not responsive to verbal redirection; hold for over-sedation  - f/u Psych recs  -shelter packet    #Anemia   -Hemoglobin 12.1. No signs of bleeding on exam.  - continue to trend hgb  - active type and     #R/o Aspiration PNA  -elevated WBC, CXR clear, febrile  -completed 5day course of CFX    Patient was discharged to: shelter    New medications: none  Changes to old medications:none  Medications that were stopped:none    Items to follow up as outpatient:    Physical exam at the time of discharge:  VITALS:     GENERAL: NAD, lying in bed comfortably  HEAD:  Atraumatic, Normocephalic  EYES: EOMI, PERRLA, conjunctiva and sclera clear  ENT: Moist mucous membranes  NECK: Supple, No JVD  CHEST/LUNG: Clear to auscultation bilaterally; No rales, rhonchi, wheezing, or rubs. Unlabored respirations  HEART: Regular rate and rhythm; No murmurs, rubs, or gallops  ABDOMEN: Bowel sounds present; Soft, Nontender, Nondistended. No hepatomegally  EXTREMITIES:  2+ Peripheral Pulses, brisk capillary refill. No clubbing, cyanosis, or edema  NERVOUS SYSTEM:  Alert & Oriented X3, speech clear. No deficits   MSK: FROM all 4 extremities, full and equal strength  SKIN: No rashes or lesions     #Discharge: do not delete    Patient is Male w/ no PMHx, unknown age, approximately 40 years old per [facility] ED, who presents for altered mental status, intubated at [facility] for airway protection, admitted for altered mental status due to drugs (?fentanyl) vs seizure vs trauma vs infectious etiology.    Hospital course (by problem):   Pt is a male of unknown age (approx 38) & unknown PMHx, who was BIBEMS for AMS after being found down on a bus, given Narcan x3 & intubated for airway protection, admitted to MICU for AHRF & AMS likely 2/2 drug use. On admission, UTox +THC & cocaine, though pt was found w/ white powder on him (possibly fentanyl). On admission, lactate elevated 2.7 (cleared). CT Head unrevealing, vEEG w/o evidence of seizure, TSH wnl. Pt initially hypotensive requiring pressors, & w/ elevated Cr --> now weaned off pressors & Cr resolved w/ fluids. ET tube w/ gram+ cocci, started on CTX 2g for possible asp PNA. Extubated on 3/24 to NC, weaned to RA. Course c/b episodes of agitation & aggression following extubation, requiring haldol & ativan several times for agitation. Psych consulted. Pt stepped down to [facility], completed abx, and safe for dischr discharge.     # Cocaine abuse.   -P/t s/p extubation (extubated 3/24), found sedated on bus with white powder around his mouth, received Narcan x3 on field. UTox + for THC and cocaine. vEEG w/o evidence of seizure. CT Head unrevealing.  Pt admits to smoking crack cocaine (gets from street) and taking "6 pink pills" before episode occurs. Pt reports smoking K2 on and off. Pt reports previously living in a shelter but [place] trains and [place] street.   - As per psych can give Haldol 5mg IM/IV/PO q6hr prn acute agitation not responsive to verbal redirection; hold for over-sedation while admitted    #R/o Aspiration PNA  -elevated WBC, CXR clear, febrile  -completed 5day course of CFX    Patient was discharged to: shelter / undomiciled     New medications: none  Changes to old medications:none  Medications that were stopped:none    Items to follow up as outpatient:    Physical exam at the time of discharge:  VITALS:     GENERAL: NAD, lying in bed comfortably  HEAD:  Atraumatic, Normocephalic  EYES: EOMI, PERRLA, conjunctiva and sclera clear  ENT: Moist mucous membranes  NECK: Supple, No JVD  CHEST/LUNG: Clear to auscultation bilaterally; No rales, rhonchi, wheezing, or rubs. Unlabored respirations  HEART: Regular rate and rhythm; No murmurs, rubs, or gallops  ABDOMEN: Bowel sounds present; Soft, Nontender, Nondistended. No hepatomegally  EXTREMITIES:  2+ Peripheral Pulses, brisk capillary refill. No clubbing, cyanosis, or edema  NERVOUS SYSTEM:  Alert & Oriented X3, speech clear. No deficits   MSK: FROM all 4 extremities, full and equal strength  SKIN: No rashes or lesions

## 2023-03-28 NOTE — DISCHARGE NOTE NURSING/CASE MANAGEMENT/SOCIAL WORK - PATIENT PORTAL LINK FT
You can access the FollowMyHealth Patient Portal offered by [facility] by registering at the following website: [email or web address]. By joining [employer]’s FollowMyHealth portal, you will also be able to view your health information using other applications (apps) compatible with our system.

## 2023-03-28 NOTE — DISCHARGE NOTE PROVIDER - NSDCCPCAREPLAN_GEN_ALL_CORE_FT
PRINCIPAL DISCHARGE DIAGNOSIS  Diagnosis: AMS (altered mental status)  Assessment and Plan of Treatment: You came into the hospital for altered mental status from overdose from using crack cocaine. We intubated you to help you breath. We performed CT imaging exam of your head and brain and it was normal. We also performed video EEG, which monitors brain activity , you had normal brain activity and no seizures. We also treated you with antibiotics for 5 days due to your elevated white blood cell count. Please refrain from using crack cocain and other drugs. Please follow up with you primary care provider for further management of care.

## 2023-03-29 PROBLEM — Z00.00 ENCOUNTER FOR PREVENTIVE HEALTH EXAMINATION: Status: ACTIVE | Noted: 2023-03-29

## 2023-04-04 DIAGNOSIS — Z78.1 PHYSICAL RESTRAINT STATUS: ICD-10-CM

## 2023-04-04 DIAGNOSIS — Z59.02 UNSHELTERED HOMELESSNESS: ICD-10-CM

## 2023-04-04 DIAGNOSIS — D64.9 ANEMIA, UNSPECIFIED: ICD-10-CM
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
## 2023-04-04 DIAGNOSIS — Z20.822 CONTACT WITH AND (SUSPECTED) EXPOSURE TO COVID-19: ICD-10-CM
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
## 2023-04-04 DIAGNOSIS — E87.29 OTHER ACIDOSIS: ICD-10-CM

## 2023-04-04 DIAGNOSIS — R41.82 ALTERED MENTAL STATUS, UNSPECIFIED: ICD-10-CM

## 2023-04-04 DIAGNOSIS — J96.02 ACUTE RESPIRATORY FAILURE WITH HYPERCAPNIA: ICD-10-CM

## 2023-04-04 DIAGNOSIS — G92.8 OTHER TOXIC ENCEPHALOPATHY: ICD-10-CM

## 2023-04-04 DIAGNOSIS — R45.1 RESTLESSNESS AND AGITATION: ICD-10-CM

## 2023-04-04 DIAGNOSIS — I95.9 HYPOTENSION, UNSPECIFIED: ICD-10-CM

## 2023-04-04 DIAGNOSIS — F05 DELIRIUM DUE TO KNOWN PHYSIOLOGICAL CONDITION: ICD-10-CM
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
## 2023-04-04 DIAGNOSIS — F12.90 CANNABIS USE, UNSPECIFIED, UNCOMPLICATED: ICD-10-CM

## 2023-04-04 DIAGNOSIS — Z65.2 PROBLEMS RELATED TO RELEASE FROM PRISON: ICD-10-CM

## 2023-04-04 DIAGNOSIS — T40.711A POISONING BY CANNABIS, ACCIDENTAL (UNINTENTIONAL), INITIAL ENCOUNTER: ICD-10-CM

## 2023-04-04 DIAGNOSIS — T40.5X1A POISONING BY COCAINE, ACCIDENTAL (UNINTENTIONAL), INITIAL ENCOUNTER: ICD-10-CM

## 2023-04-04 DIAGNOSIS — R68.0 HYPOTHERMIA, NOT ASSOCIATED WITH LOW ENVIRONMENTAL TEMPERATURE: ICD-10-CM

## 2023-04-04 DIAGNOSIS — F14.10 COCAINE ABUSE, UNCOMPLICATED: ICD-10-CM
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
## 2023-04-04 DIAGNOSIS — R33.9 RETENTION OF URINE, UNSPECIFIED: ICD-10-CM

## 2023-04-04 DIAGNOSIS — J69.0 PNEUMONITIS DUE TO INHALATION OF FOOD AND VOMIT: ICD-10-CM

## 2023-04-04 SDOH — ECONOMIC STABILITY - HOUSING INSECURITY: UNSHELTERED HOMELESSNESS: Z59.02

## 2023-08-27 ENCOUNTER — EMERGENCY (EMERGENCY)
Facility: HOSPITAL | Age: 38
LOS: 1 days | Discharge: HOME | End: 2023-08-27
Attending: EMERGENCY MEDICINE | Admitting: EMERGENCY MEDICINE
Payer: COMMERCIAL

## 2023-08-27 VITALS
DIASTOLIC BLOOD PRESSURE: 88 MMHG | TEMPERATURE: 99 F | RESPIRATION RATE: 18 BRPM | OXYGEN SATURATION: 99 % | HEART RATE: 65 BPM | SYSTOLIC BLOOD PRESSURE: 154 MMHG

## 2023-08-27 VITALS
TEMPERATURE: 98 F | SYSTOLIC BLOOD PRESSURE: 148 MMHG | OXYGEN SATURATION: 97 % | RESPIRATION RATE: 18 BRPM | DIASTOLIC BLOOD PRESSURE: 90 MMHG | WEIGHT: 149.91 LBS | HEIGHT: 64 IN | HEART RATE: 82 BPM

## 2023-08-27 DIAGNOSIS — F19.99 OTHER PSYCHOACTIVE SUBSTANCE USE, UNSPECIFIED WITH UNSPECIFIED PSYCHOACTIVE SUBSTANCE-INDUCED DISORDER: ICD-10-CM

## 2023-08-27 DIAGNOSIS — Z23 ENCOUNTER FOR IMMUNIZATION: ICD-10-CM

## 2023-08-27 DIAGNOSIS — S90.811A ABRASION, RIGHT FOOT, INITIAL ENCOUNTER: ICD-10-CM
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
## 2023-08-27 DIAGNOSIS — S60.511A ABRASION OF RIGHT HAND, INITIAL ENCOUNTER: ICD-10-CM
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
## 2023-08-27 DIAGNOSIS — S90.812A ABRASION, LEFT FOOT, INITIAL ENCOUNTER: ICD-10-CM

## 2023-08-27 DIAGNOSIS — S60.512A ABRASION OF LEFT HAND, INITIAL ENCOUNTER: ICD-10-CM

## 2023-08-27 PROCEDURE — 90471 IMMUNIZATION ADMIN: CPT

## 2023-08-27 PROCEDURE — 82962 GLUCOSE BLOOD TEST: CPT

## 2023-08-27 PROCEDURE — 99285 EMERGENCY DEPT VISIT HI MDM: CPT

## 2023-08-27 PROCEDURE — 73130 X-RAY EXAM OF HAND: CPT | Mod: 26,LT,RT

## 2023-08-27 PROCEDURE — 99284 EMERGENCY DEPT VISIT MOD MDM: CPT | Mod: 25

## 2023-08-27 PROCEDURE — 70450 CT HEAD/BRAIN W/O DYE: CPT | Mod: MA

## 2023-08-27 PROCEDURE — 73110 X-RAY EXAM OF WRIST: CPT | Mod: 26,RT

## 2023-08-27 PROCEDURE — 73130 X-RAY EXAM OF HAND: CPT

## 2023-08-27 PROCEDURE — 90715 TDAP VACCINE 7 YRS/> IM: CPT

## 2023-08-27 PROCEDURE — 73630 X-RAY EXAM OF FOOT: CPT

## 2023-08-27 PROCEDURE — 70450 CT HEAD/BRAIN W/O DYE: CPT | Mod: 26,MA

## 2023-08-27 PROCEDURE — 73110 X-RAY EXAM OF WRIST: CPT

## 2023-08-27 PROCEDURE — 73630 X-RAY EXAM OF FOOT: CPT | Mod: 26,LT,RT

## 2023-08-27 RX ORDER — ACETAMINOPHEN 500 MG
975 TABLET ORAL ONCE
Refills: 0 | Status: COMPLETED | OUTPATIENT
Start: 2023-08-27 | End: 2023-08-27

## 2023-08-27 RX ORDER — TETANUS TOXOID, REDUCED DIPHTHERIA TOXOID AND ACELLULAR PERTUSSIS VACCINE, ADSORBED 5; 2.5; 8; 8; 2.5 [IU]/.5ML; [IU]/.5ML; UG/.5ML; UG/.5ML; UG/.5ML
0.5 SUSPENSION INTRAMUSCULAR ONCE
Refills: 0 | Status: COMPLETED | OUTPATIENT
Start: 2023-08-27 | End: 2023-08-27

## 2023-08-27 RX ORDER — CEPHALEXIN 500 MG
500 CAPSULE ORAL ONCE
Refills: 0 | Status: COMPLETED | OUTPATIENT
Start: 2023-08-27 | End: 2023-08-27

## 2023-08-27 RX ADMIN — Medication 975 MILLIGRAM(S): at 13:12

## 2023-08-27 RX ADMIN — Medication 975 MILLIGRAM(S): at 11:51

## 2023-08-27 RX ADMIN — TETANUS TOXOID, REDUCED DIPHTHERIA TOXOID AND ACELLULAR PERTUSSIS VACCINE, ADSORBED 0.5 MILLILITER(S): 5; 2.5; 8; 8; 2.5 SUSPENSION INTRAMUSCULAR at 13:11

## 2023-08-27 RX ADMIN — Medication 500 MILLIGRAM(S): at 13:11

## 2023-08-27 NOTE — ED PROVIDER NOTE - CLINICAL SUMMARY MEDICAL DECISION MAKING FREE TEXT BOX
drug use/diffuse skin abrasions, will allow to sober, eval osseous injury, awake talking,w ill reassess. Pt w drug use/diffuse skin abrasions, will allow to sober, eval osseous injury, reassess. refusing oral meds in ED. multiple reassessments in ED, pt sobering, awake and talking , ambulatory, no acute osseous injury, CT head negative, no other complaints, will dc.

## 2023-08-27 NOTE — ED ADULT NURSE NOTE - OBJECTIVE STATEMENT
BIBEMS called by [place] for multiple bruises, abrasions, pain s/p punching concrete ground throughout the night on K2 and cocaine. C/o pain "everywhere."     On assessment- AOx4 with drowsiness, breathing even and unlabored on RA, no apparent distress x pain with movement, VSS in triage, able to speak in clear coherent sentences, gait assessment deferred given drowsiness, neuro intact with no apparent facial asymmetry, PERRLA. +Sig swelling to R hand and forearm with suspected deformity to wrist? +Abrasions to R big toe, R lateral foot, L knee, B knuckles, B elbows. No uncontrolled bleeding.

## 2023-08-27 NOTE — ED ADULT TRIAGE NOTE - CHIEF COMPLAINT QUOTE
as per EMS and patient he did K2 and crack last night, EMS states patient was hitting the cement outdoors

## 2023-08-27 NOTE — ED PROVIDER NOTE - PHYSICAL EXAMINATION
CONSTITUTIONAL: sleeping, arousble, injected conjunctiva  HEENT: Head is atraumatic. Eyes clear bilaterally, normal EOMI. Airway patent.  CARDIAC: Normal rate, regular rhythm.  Heart sounds S1, S2.   RESPIRATORY: Breath sounds clear and equal bilaterally. no tachypnea, respiratory distress.   GASTROINTESTINAL: Abdomen soft, non-tender, no guarding, distension.  MUSCULOSKELETAL: Spine appears normal, no midline spinal tenderness, range of motion is not limited, no muscle or joint tenderness. no bony tenderness. diffuse abrasion/aopen wounds to hands/feet  NEUROLOGICAL: Alert, no focal deficits, no motor or sensory deficits. diffuse abrasions to feet b/l  SKIN: Skin normal color for race, warm, dry and intact. No evidence of rash.  PSYCHIATRIC: Normal mood and affect. no apparent risk to self or others.

## 2023-08-27 NOTE — ED PROVIDER NOTE - NSFOLLOWUPINSTRUCTIONS_ED_ALL_ED_FT
[facility]  Family Medicine  [address]  [place], [place] [address]  Phone: [phone]

## 2023-08-27 NOTE — ED ADULT NURSE REASSESSMENT NOTE - NS ED NURSE REASSESS COMMENT FT1
Pt refusing wound wash and bacitracin application, reports pain, education attempted, pt continues to refuse. Will re-attempt p medication.

## 2023-08-27 NOTE — ED PROVIDER NOTE - PATIENT PORTAL LINK FT
You can access the FollowMyHealth Patient Portal offered by [facility] by registering at the following website: [email or web address]. By joining [employer]’s FollowMyHealth portal, you will also be able to view your health information using other applications (apps) compatible with our system.

## 2023-08-27 NOTE — ED PROVIDER NOTE - OBJECTIVE STATEMENT
39 yo no pmh here found on the streets after "hitting cement", per ems ?cocaine/K2 use, w diffuse abrasions to hands/feet. pt intoxicated appearing, poor historian. 37 yo no pmh here found on the streets after "hitting cement", per ems wiith ?cocaine/K2 use, now w diffuse abrasions to hands/feet. pt intoxicated appearing, poor historian. ambulatory, no known meds/ac use.

## 2023-08-27 NOTE — ED ADULT NURSE REASSESSMENT NOTE - NS ED NURSE REASSESS COMMENT FT1
All wounds cleaned with saline, baci applied, non-adh bandages applied. Pt given bag of supplies for wound cleaning.

## 2023-08-27 NOTE — ED ADULT NURSE NOTE - NS_ED_NURSE_TEACHING_TOPIC_ED_A_ED
Normal Normal Normal Normal Normal Normal Normal Normal Normal Normal Normal Normal Normal Normal drug use/Wound care Normal Normal

## 2024-07-09 ENCOUNTER — EMERGENCY (EMERGENCY)
Facility: HOSPITAL | Age: 39
LOS: 1 days | Discharge: HOME | End: 2024-07-09
Attending: EMERGENCY MEDICINE | Admitting: EMERGENCY MEDICINE
Payer: SELF-PAY

## 2024-07-09 VITALS
HEIGHT: 64 IN | WEIGHT: 197.98 LBS | SYSTOLIC BLOOD PRESSURE: 150 MMHG | TEMPERATURE: 98 F | OXYGEN SATURATION: 98 % | DIASTOLIC BLOOD PRESSURE: 92 MMHG | RESPIRATION RATE: 16 BRPM | HEART RATE: 95 BPM

## 2024-07-09 VITALS
SYSTOLIC BLOOD PRESSURE: 134 MMHG | DIASTOLIC BLOOD PRESSURE: 77 MMHG | TEMPERATURE: 98 F | OXYGEN SATURATION: 97 % | RESPIRATION RATE: 16 BRPM | HEART RATE: 93 BPM

## 2024-07-09 PROCEDURE — 99283 EMERGENCY DEPT VISIT LOW MDM: CPT

## 2024-07-09 PROCEDURE — 99285 EMERGENCY DEPT VISIT HI MDM: CPT

## 2024-07-09 RX ORDER — ACETAMINOPHEN 325 MG
650 TABLET ORAL ONCE
Refills: 0 | Status: COMPLETED | OUTPATIENT
Start: 2024-07-09 | End: 2024-07-09

## 2024-07-09 RX ADMIN — Medication 650 MILLIGRAM(S): at 11:54
